# Patient Record
Sex: FEMALE | Race: WHITE
[De-identification: names, ages, dates, MRNs, and addresses within clinical notes are randomized per-mention and may not be internally consistent; named-entity substitution may affect disease eponyms.]

---

## 2017-02-08 ENCOUNTER — HOSPITAL ENCOUNTER (EMERGENCY)
Dept: HOSPITAL 25 - ED | Age: 37
Discharge: HOME | End: 2017-02-08
Payer: MEDICARE

## 2017-02-08 VITALS — DIASTOLIC BLOOD PRESSURE: 78 MMHG | SYSTOLIC BLOOD PRESSURE: 156 MMHG

## 2017-02-08 DIAGNOSIS — R10.32: Primary | ICD-10-CM

## 2017-02-08 DIAGNOSIS — Z72.0: ICD-10-CM

## 2017-02-08 DIAGNOSIS — Z88.5: ICD-10-CM

## 2017-02-08 LAB
ALBUMIN SERPL BCG-MCNC: 4.4 G/DL (ref 3.2–5.2)
ALP SERPL-CCNC: 70 U/L (ref 34–104)
ALT SERPL W P-5'-P-CCNC: 15 U/L (ref 7–52)
ANION GAP SERPL CALC-SCNC: 5 MMOL/L (ref 2–11)
AST SERPL-CCNC: 14 U/L (ref 13–39)
BUN SERPL-MCNC: 9 MG/DL (ref 6–24)
BUN/CREAT SERPL: 11.4 (ref 8–20)
CALCIUM SERPL-MCNC: 9.5 MG/DL (ref 8.6–10.3)
CHLORIDE SERPL-SCNC: 108 MMOL/L (ref 101–111)
GLOBULIN SER CALC-MCNC: 3 G/DL (ref 2–4)
GLUCOSE SERPL-MCNC: 89 MG/DL (ref 70–100)
HCO3 SERPL-SCNC: 22 MMOL/L (ref 22–32)
HCT VFR BLD AUTO: 48 % (ref 35–47)
HGB BLD-MCNC: 16 G/DL (ref 12–16)
LIPASE SERPL-CCNC: 16 U/L (ref 11–82)
MCH RBC QN AUTO: 30 PG (ref 27–31)
MCHC RBC AUTO-ENTMCNC: 34 G/DL (ref 31–36)
MCV RBC AUTO: 90 FL (ref 80–97)
POTASSIUM SERPL-SCNC: 4.1 MMOL/L (ref 3.5–5)
PROT SERPL-MCNC: 7.4 G/DL (ref 6.4–8.9)
RBC # BLD AUTO: 5.27 10^6/UL (ref 4–5.4)
SODIUM SERPL-SCNC: 135 MMOL/L (ref 133–145)
TROPONIN I SERPL-MCNC: 0 NG/ML (ref ?–0.04)
WBC # BLD AUTO: 8.5 10^3/UL (ref 3.5–10.8)

## 2017-02-08 PROCEDURE — 99283 EMERGENCY DEPT VISIT LOW MDM: CPT

## 2017-02-08 PROCEDURE — 83605 ASSAY OF LACTIC ACID: CPT

## 2017-02-08 PROCEDURE — 80053 COMPREHEN METABOLIC PANEL: CPT

## 2017-02-08 PROCEDURE — 74177 CT ABD & PELVIS W/CONTRAST: CPT

## 2017-02-08 PROCEDURE — 85025 COMPLETE CBC W/AUTO DIFF WBC: CPT

## 2017-02-08 PROCEDURE — 84702 CHORIONIC GONADOTROPIN TEST: CPT

## 2017-02-08 PROCEDURE — 86140 C-REACTIVE PROTEIN: CPT

## 2017-02-08 PROCEDURE — 36415 COLL VENOUS BLD VENIPUNCTURE: CPT

## 2017-02-08 PROCEDURE — 81003 URINALYSIS AUTO W/O SCOPE: CPT

## 2017-02-08 PROCEDURE — 83690 ASSAY OF LIPASE: CPT

## 2017-02-08 PROCEDURE — 84484 ASSAY OF TROPONIN QUANT: CPT

## 2017-02-08 NOTE — RAD
CLINICAL HISTORY: Left upper quadrant pain



COMPARISON: September 25, 2012



TECHNIQUE: Multiple contiguous axial CT scans were obtained of the abdomen and pelvis

after the administration of intravenous contrast. Coronal and sagittal multiplanar

reformations are submitted for review.  Oral contrast was administered.  Delayed images

were obtained through the abdomen



FINDINGS:    



LUNG BASES: The lung bases are clear.



LIVER: The liver is diffusely low in attenuation compared to the spleen. There are no

focal hepatic parenchymal masses.

BILE DUCTS: There is no intrahepatic or extrahepatic biliary dilatation.

GALLBLADDER: The gallbladder is normal, without pericholecystic inflammatory change.



PANCREAS: The pancreas is normal, without mass or ductal dilatation.

SPLEEN: Normal in size and appearance.



UPPER GI TRACT: Evaluation of the gastrointestinal tract is limited by incomplete gastric

distention. The upper GI tract is unremarkable.

SMALL BOWEL AND MESENTERY: The small bowel is normal in contour, course, and caliber.

There is no obstruction or dilatation.

COLON: The colon is normal in contour, course, caliber. There is no pericolonic

inflammatory change.



ADRENALS: Normal bilaterally.

KIDNEYS: The kidneys are normal in shape, size, contour, and axis. There is no

hydronephrosis or nephrolithiasis.

BLADDER: The bladder is smooth in contour.



PELVIC ORGANS: The uterus and adnexa are grossly normal for technique.



AORTA: The aorta is normal.

IVC: Unremarkable



LYMPH NODES: There is no lymphadenopathy by size criteria.



ABDOMINAL WALL: There is no evidence for abdominal wall hernia.

BONES AND SOFT TISSUES: The bones and soft tissues are unremarkable.

OTHER: None



IMPRESSION:

FATTY LIVER.

NO ACUTE CT PATHOLOGY OF THE VISUALIZED ABDOMEN OR PELVIS

## 2017-02-08 NOTE — ED
Navid MCGUIRE Billy, scribed for Chung Martínez MD on 02/08/17 at 1459 .





Complex/Multi-Sys Presentation





- HPI Summary


HPI Summary: 


Patient is 36 y/o female who presents to [facility] with left flank pain, left lower 

abdominal pain, and dyspnea for 4 days. Dyspnea is unchanged with recumbent 

position. She describes N/V/D and abdominal pain. Three weeks ago, she saw her 

PCP for these symptoms, and she states that a left ovarian cyst was visualized 

on ultrasound. Last week, she followed up with OB/GYN, who was unable to 

visualize the cyst on ultrasound. However, she states that she was given a 

diagnosis of endometriosis at that time. She reports pain worsened 4 days ago, 

and has been constant since. She also reports her abd feels distended and 

uncomfortable. Patient reports a history of psychiatric illnesses and is 

concerned that her symptoms are "all in her head."





- History Of Current Complaint


Chief Complaint: EDGeneral


Time Seen by Provider: 02/08/17 13:47


Hx Obtained From: Patient


Onset/Duration: Gradual Onset, Lasting Weeks, Still Present, Worse Since - 4 

days


Timing: Constant


Severity Currently: Moderate


Severity Initially: Moderate


Location: Pain At: - LLQ, left flank


Aggravating Factor(s): Nothing.


Alleviating Factor(s): Nothing.


Associated Signs And Symptoms: Positive: SOB, Nausea, Vomiting, Diarrhea, 

Abdominal Pain, Other - Flank pain





- Allergies/Home Medications


Allergies/Adverse Reactions: 


 Allergies











Allergy/AdvReac Type Severity Reaction Status Date / Time


 


Hydrocodone Allergy  Unknown Verified 02/15/16 00:08





   Reaction  





   Details  


 


SSRIS Allergy  Unknown Uncoded 02/15/16 00:08





   Reaction  





   Details  














PMH/Surg Hx/FS Hx/Imm Hx


Endocrine/Hematology History: 


   Denies: Hx Diabetes, Hx Systemic Lupus Erythematosus, Hx Thyroid Disease


Cardiovascular History: 


   Denies: Hx Hypertension


Respiratory History: 


   Denies: Hx Asthma, Hx Chronic Obstructive Pulmonary Disease (COPD)


GI History: 


   Denies: Hx Ulcer


Musculoskeletal History: 


   Denies: Hx Rheumatoid Arthritis


Psychiatric History: Reports: Hx Anxiety, Hx Eating Disorder, Hx Depression, Hx 

Panic Disorder, Hx Inpatient Treatment, Hx Community Mental Health Tx, Hx 

Bipolar Disorder, Hx Substance Abuse, Other Psychiatric Issues/Disorders


   Denies: Hx Attention Deficit Hyperactivity Disorder, Hx Post Traumatic 

Stress Disorder, Hx Schizophrenia, Hx Suicide Attempt, Hx of Violent Episodes 

Against Others





- Cancer History


Hx Chemotherapy: No


Hx Radiation Therapy: No


Hx Palliative Cancer Treatment: No





- Surgical History


Surgery Procedure, Year, and Place: 2001 - RIGHT Lymph nodes removed to biopsy 

thought she had cancer - she didn't.  2011- C section


Infectious Disease History: No


Infectious Disease History: 


   Denies: Hx Clostridium Difficile, Hx Hepatitis, Hx Human Immunodeficiency 

Virus (HIV), Hx of Known/Suspected MRSA, Hx Shingles, Hx Tuberculosis, Hx Known/

Suspected VRE, Hx Known/Suspected VRSA, History Other Infectious Disease, 

Traveled Outside the [place] in Last 30 Days





- Family History


Known Family History: Positive: Diabetes - Father , Other - CVA mother





- Social History


Alcohol Use: None


Hx Substance Use: No


Substance Use Type: Reports: None


Hx Tobacco Use: Yes


Smoking Status (MU): Heavy Every Day Tobacco Smoker





Review of Systems


Positive: Shortness Of Breath


Positive: Abdominal Pain, Vomiting, Diarrhea, Nausea, Other - Distended. 


Positive: flank pain - Left


All Other Systems Reviewed And Are Negative: Yes





Physical Exam


Triage Information Reviewed: Yes


Vital Signs On Initial Exam: 


 Initial Vitals











Temp Pulse Resp BP Pulse Ox


 


 98.2 F   87   18   139/81   100 


 


 02/08/17 13:15  02/08/17 13:15  02/08/17 13:15  02/08/17 13:15  02/08/17 13:15











Vital Signs Reviewed: Yes


Appearance: Positive: Well-Appearing, No Pain Distress


Skin: Positive: Warm, Skin Color Reflects Adequate Perfusion, Dry


Head/Face: Positive: Normal Head/Face Inspection


Eyes: Positive: Normal


ENT: Positive: Normal ENT inspection


Neck: Positive: Supple, Nontender


Respiratory/Lung Sounds: Positive: Clear to Auscultation, Breath Sounds Present


Cardiovascular: Positive: RRR


Abdomen Description: Positive: Other: - No tenderness to palpation with 

stethoscope. Mild tenderness to upper quadrants with deep palpation.


Musculoskeletal: Positive: Normal


Neurological: Positive: Normal





Diagnostics





- Vital Signs


 Vital Signs











  Temp Pulse Resp BP Pulse Ox


 


 02/08/17 13:15  98.2 F  87  18  139/81  100














- Laboratory


Lab Results: 


 Lab Results











  02/08/17 02/08/17 02/08/17 Range/Units





  14:10 14:10 14:10 


 


WBC  8.5    (3.5-10.8)  10^3/ul


 


RBC  5.27    (4.0-5.4)  10^6/ul


 


Hgb  16.0    (12.0-16.0)  g/dl


 


Hct  48 H    (35-47)  %


 


MCV  90    (80-97)  fL


 


MCH  30    (27-31)  pg


 


MCHC  34    (31-36)  g/dl


 


RDW  13    (10.5-15)  %


 


Plt Count  270    (150-450)  10^3/ul


 


MPV  9    (7.4-10.4)  um3


 


Neut % (Auto)  44.6    (38-83)  %


 


Lymph % (Auto)  46.8    (25-47)  %


 


Mono % (Auto)  6.3    (1-9)  %


 


Eos % (Auto)  1.8    (0-6)  %


 


Baso % (Auto)  0.5    (0-2)  %


 


Absolute Neuts (auto)  3.8    (1.5-7.7)  10^3/ul


 


Absolute Lymphs (auto)  4.0    (1.0-4.8)  10^3/ul


 


Absolute Monos (auto)  0.5    (0-0.8)  10^3/ul


 


Absolute Eos (auto)  0.2    (0-0.6)  10^3/ul


 


Absolute Basos (auto)  0    (0-0.2)  10^3/ul


 


Absolute Nucleated RBC  0    10^3/ul


 


Nucleated RBC %  0    


 


Sodium    135  (133-145)  mmol/L


 


Potassium    4.1  (3.5-5.0)  mmol/L


 


Chloride    108  (101-111)  mmol/L


 


Carbon Dioxide    22  (22-32)  mmol/L


 


Anion Gap    5  (2-11)  mmol/L


 


BUN    9  (6-24)  mg/dL


 


Creatinine    0.79  (0.51-0.95)  mg/dL


 


Est GFR ( Amer)    105.3  (>60)  


 


Est GFR (Non-Af Amer)    81.9  (>60)  


 


BUN/Creatinine Ratio    11.4  (8-20)  


 


Glucose    89  ()  mg/dL


 


Lactic Acid     (0.5-2.0)  mmol/L


 


Calcium    9.5  (8.6-10.3)  mg/dL


 


Total Bilirubin    0.40  (0.2-1.0)  mg/dL


 


AST    14  (13-39)  U/L


 


ALT    15  (7-52)  U/L


 


Alkaline Phosphatase    70  ()  U/L


 


Troponin I    0.00  (<0.04)  ng/mL


 


C-Reactive Protein    1.08  (< 5.00)  mg/L


 


Total Protein    7.4  (6.4-8.9)  g/dL


 


Albumin    4.4  (3.2-5.2)  g/dL


 


Globulin    3.0  (2-4)  g/dL


 


Albumin/Globulin Ratio    1.5  (1-3)  


 


Lipase    16  (11.0-82.0)  U/L


 


Beta HCG, Quant    < 0.60  mIU/mL


 


Urine Color   Yellow   


 


Urine Appearance   Cloudy   


 


Urine pH   6.0   (5-9)  


 


Ur Specific Gravity   1.019   (1.010-1.030)  


 


Urine Protein   Negative   (Negative)  


 


Urine Ketones   Trace H   (Negative)  


 


Urine Blood   Negative   (Negative)  


 


Urine Nitrate   Negative   (Negative)  


 


Urine Bilirubin   Negative   (Negative)  


 


Urine Urobilinogen   Negative   (Negative)  


 


Ur Leukocyte Esterase   Negative   (Negative)  


 


Urine Glucose   Negative   (Negative)  














  02/08/17 Range/Units





  14:10 


 


WBC   (3.5-10.8)  10^3/ul


 


RBC   (4.0-5.4)  10^6/ul


 


Hgb   (12.0-16.0)  g/dl


 


Hct   (35-47)  %


 


MCV   (80-97)  fL


 


MCH   (27-31)  pg


 


MCHC   (31-36)  g/dl


 


RDW   (10.5-15)  %


 


Plt Count   (150-450)  10^3/ul


 


MPV   (7.4-10.4)  um3


 


Neut % (Auto)   (38-83)  %


 


Lymph % (Auto)   (25-47)  %


 


Mono % (Auto)   (1-9)  %


 


Eos % (Auto)   (0-6)  %


 


Baso % (Auto)   (0-2)  %


 


Absolute Neuts (auto)   (1.5-7.7)  10^3/ul


 


Absolute Lymphs (auto)   (1.0-4.8)  10^3/ul


 


Absolute Monos (auto)   (0-0.8)  10^3/ul


 


Absolute Eos (auto)   (0-0.6)  10^3/ul


 


Absolute Basos (auto)   (0-0.2)  10^3/ul


 


Absolute Nucleated RBC   10^3/ul


 


Nucleated RBC %   


 


Sodium   (133-145)  mmol/L


 


Potassium   (3.5-5.0)  mmol/L


 


Chloride   (101-111)  mmol/L


 


Carbon Dioxide   (22-32)  mmol/L


 


Anion Gap   (2-11)  mmol/L


 


BUN   (6-24)  mg/dL


 


Creatinine   (0.51-0.95)  mg/dL


 


Est GFR ( Amer)   (>60)  


 


Est GFR (Non-Af Amer)   (>60)  


 


BUN/Creatinine Ratio   (8-20)  


 


Glucose   ()  mg/dL


 


Lactic Acid  0.6  (0.5-2.0)  mmol/L


 


Calcium   (8.6-10.3)  mg/dL


 


Total Bilirubin   (0.2-1.0)  mg/dL


 


AST   (13-39)  U/L


 


ALT   (7-52)  U/L


 


Alkaline Phosphatase   ()  U/L


 


Troponin I   (<0.04)  ng/mL


 


C-Reactive Protein   (< 5.00)  mg/L


 


Total Protein   (6.4-8.9)  g/dL


 


Albumin   (3.2-5.2)  g/dL


 


Globulin   (2-4)  g/dL


 


Albumin/Globulin Ratio   (1-3)  


 


Lipase   (11.0-82.0)  U/L


 


Beta HCG, Quant   mIU/mL


 


Urine Color   


 


Urine Appearance   


 


Urine pH   (5-9)  


 


Ur Specific Gravity   (1.010-1.030)  


 


Urine Protein   (Negative)  


 


Urine Ketones   (Negative)  


 


Urine Blood   (Negative)  


 


Urine Nitrate   (Negative)  


 


Urine Bilirubin   (Negative)  


 


Urine Urobilinogen   (Negative)  


 


Ur Leukocyte Esterase   (Negative)  


 


Urine Glucose   (Negative)  











Result Diagrams: 


 02/08/17 14:10





 02/08/17 14:10


Lab Statement: Any lab studies that have been ordered have been reviewed, and 

results considered in the medical decision making process.





- CT


  ** abd/pel


CT Interpretation: No Acute Changes


CT Interpretation Completed By: Radiologist





Complex Multi-Symp Course/Dx


Course Of Treatment: Ms. Foley presented C/O abdominal pain which she had a 

great deal of trouble characterizing.  Her W/U here was normal and she has an 

appointment with her PMD in the morning so I will let her go home to F/U.





- Diagnoses


Provider Diagnoses: 


 Abdominal pain








Discharge





- Discharge Plan


Condition: Stable


Disposition: HOME


Patient Education Materials:  Abdominal Pain (ED)


Referrals: 


Gabrielle Villanueva MD [Primary Care Provider] - 1 Day


Additional Instructions: 


FOLLOW UP WITH DR. VILLANUEVA AS SCHEDULED.





The documentation as recorded by the kelseyibNavid bhatti Billy accurately reflects the 

service I personally performed and the decisions made by me, Chung Martínez MD.

## 2017-05-05 ENCOUNTER — HOSPITAL ENCOUNTER (EMERGENCY)
Dept: HOSPITAL 25 - ED | Age: 37
Discharge: ANOTHER HOSPITAL | End: 2017-05-05
Payer: MEDICARE

## 2017-05-05 VITALS — DIASTOLIC BLOOD PRESSURE: 72 MMHG | SYSTOLIC BLOOD PRESSURE: 113 MMHG

## 2017-05-05 DIAGNOSIS — R19.7: ICD-10-CM

## 2017-05-05 DIAGNOSIS — R50.9: ICD-10-CM

## 2017-05-05 DIAGNOSIS — M54.9: ICD-10-CM

## 2017-05-05 DIAGNOSIS — R11.2: ICD-10-CM

## 2017-05-05 DIAGNOSIS — K57.92: Primary | ICD-10-CM

## 2017-05-05 DIAGNOSIS — R10.9: ICD-10-CM

## 2017-05-05 DIAGNOSIS — F17.210: ICD-10-CM

## 2017-05-05 LAB
ALBUMIN SERPL BCG-MCNC: 3.8 G/DL (ref 3.2–5.2)
ALP SERPL-CCNC: 63 U/L (ref 34–104)
ALT SERPL W P-5'-P-CCNC: 11 U/L (ref 7–52)
ANION GAP SERPL CALC-SCNC: 7 MMOL/L (ref 2–11)
AST SERPL-CCNC: 11 U/L (ref 13–39)
BUN SERPL-MCNC: 8 MG/DL (ref 6–24)
BUN/CREAT SERPL: 9.3 (ref 8–20)
CALCIUM SERPL-MCNC: 8.8 MG/DL (ref 8.6–10.3)
CHLORIDE SERPL-SCNC: 109 MMOL/L (ref 101–111)
GLOBULIN SER CALC-MCNC: 2.6 G/DL (ref 2–4)
GLUCOSE SERPL-MCNC: 103 MG/DL (ref 70–100)
HCO3 SERPL-SCNC: 21 MMOL/L (ref 22–32)
HCT VFR BLD AUTO: 42 % (ref 35–47)
HGB BLD-MCNC: 13.7 G/DL (ref 12–16)
LIPASE SERPL-CCNC: 35 U/L (ref 11–82)
MCH RBC QN AUTO: 30 PG (ref 27–31)
MCHC RBC AUTO-ENTMCNC: 33 G/DL (ref 31–36)
MCV RBC AUTO: 92 FL (ref 80–97)
POTASSIUM SERPL-SCNC: 3.7 MMOL/L (ref 3.5–5)
PROT SERPL-MCNC: 6.4 G/DL (ref 6.4–8.9)
RBC # BLD AUTO: 4.53 10^6/UL (ref 4–5.4)
SODIUM SERPL-SCNC: 137 MMOL/L (ref 133–145)
TROPONIN I SERPL-MCNC: 0.01 NG/ML (ref ?–0.04)
WBC # BLD AUTO: 10.4 10^3/UL (ref 3.5–10.8)

## 2017-05-05 PROCEDURE — 85025 COMPLETE CBC W/AUTO DIFF WBC: CPT

## 2017-05-05 PROCEDURE — 99283 EMERGENCY DEPT VISIT LOW MDM: CPT

## 2017-05-05 PROCEDURE — 85730 THROMBOPLASTIN TIME PARTIAL: CPT

## 2017-05-05 PROCEDURE — 84484 ASSAY OF TROPONIN QUANT: CPT

## 2017-05-05 PROCEDURE — 84702 CHORIONIC GONADOTROPIN TEST: CPT

## 2017-05-05 PROCEDURE — 36415 COLL VENOUS BLD VENIPUNCTURE: CPT

## 2017-05-05 PROCEDURE — 74177 CT ABD & PELVIS W/CONTRAST: CPT

## 2017-05-05 PROCEDURE — 83690 ASSAY OF LIPASE: CPT

## 2017-05-05 PROCEDURE — 80053 COMPREHEN METABOLIC PANEL: CPT

## 2017-05-05 PROCEDURE — 93005 ELECTROCARDIOGRAM TRACING: CPT

## 2017-05-05 PROCEDURE — 85610 PROTHROMBIN TIME: CPT

## 2017-05-05 PROCEDURE — 83605 ASSAY OF LACTIC ACID: CPT

## 2017-05-05 NOTE — ED
Patrizia MCGUIRE Auryana, scribed for Prudencio Foster MD on 05/05/17 at 0749 .





Progress





- Progress Note


Progress Note: 


Sign out by Dr. Hurd to Dr. Foster at 07:00 5/5/17


Pending ABD/PEL CT at time of sign out.





37 year old female presents with moderate left sided abdominal pain starting 2 

days ago. She also has decreased appetite with early satiation, nausea and dry 

heaves-every morning. She denies any pain medication recently-states out of 

Percocet Rx for the last few days.





Will give medication to treat pain. Awaiting CT ABD/PEL.








- Results/Orders


Results/Orders: 


ABD/PEL CT                                                                     

                                   





IMPRESSION: NORMAL APPENDIX. LIKELY DIVERTICULITIS OF THE SIGMOID COLON WITHOUT 

EVIDENCE


OF PERIDIVERTICULAR ABSCESS.








- EKG/XRAY/CT


EKG: NSR


Comments: 07:06 NSR, normal axis





Re-Evaluation





- Re-Evaluation


  ** First Eval


Re-Evaluation Time: 10:20 - DICUSSED LABS. IMAGING, AND PLAN OF ACTION 


Change: Improved





Course/Dx





- Course


Course Of Treatment: pt will be discharged to home on Flagyl 500mg po qid x 10 

days, cipro 500mg po bid x 10days and percocet one po q 6hours





- Diagnoses


Provider Diagnoses: 


 Diverticulitis








The documentation as recorded by the Patrizia linn Auryana accurately 

reflects the service I personally performed and the decisions made by me, Prudencio Foster MD.

## 2017-05-05 NOTE — RAD
Indication: Abdominal pain mid and lower left quadrant.



Contrast: Administered 91.7 ml of OMNIPAQUE 300 mgi/ml 



CT of the abdomen and pelvis was performed after oral and IV contrast administration.

Coronal and sagittal reconstructed images were obtained.



The lung bases demonstrate no pleural fluid, nodules or masses. Heart is of normal size

without evidence of pericardial effusion.



The liver is normal in size. No focal lesions or intrahepatic ductal dilatation is noted.

The gallbladder demonstrates no calcified gallstones. No pericholecystic fluid or wall

thickening is identified. The pancreas demonstrates no pancreatic duct dilatation. The

spleen is normal in size. No adrenal masses are noted. The kidneys demonstrate symmetric

nephrograms. No retroperitoneal lymphadenopathy is noted. Dilated loops of bowel are

noted. The colon is filled with stool.



CT of the pelvis demonstrates appendix to be normal. No dilated loops of bowel are noted.

The uterus and ovaries are unremarkable. No hernias are noted. There is some narrowing of

the sigmoid colon with some mild wall thickening. Some mild infiltration of fat is noted.

Some minimal diverticulitis of the sigmoid colon should BE considered. No abscess is

noted.



IMPRESSION: NORMAL APPENDIX. LIKELY DIVERTICULITIS OF THE SIGMOID COLON WITHOUT EVIDENCE

OF PERIDIVERTICULAR ABSCESS.

## 2017-05-05 NOTE — ED
Yayo MCGUIRE Aidan, scribed for VickeyLon on 05/05/17 at 0637 .





Abdominal Pain/Female





- HPI Summary


HPI Summary: 


38 y/o female presents to the ED with a complaint of acute, constant, moderate-

to-severe (8/10) abdominal pain that has persisted intermittently for over a 

week and just recently became constant. 2 days ago, she had a nuclear test 

done. Since the test, she has reported increased nausea, vomiting, diarrhea, 

extreme lack of appetite, abdominal pain, back pain, and bloating. She has an 

appointment with a GI specialist this coming Tuesday.





- History of Current Complaint


Chief Complaint: EDAbdPain


Stated Complaint: N,V,D/LOSS OF APPITITE,LOWER BACK PAIN/FEVER


Time Seen by Provider: 05/05/17 06:02


Hx Obtained From: Patient


Hx Last Menstrual Period: 4/9/13


Pregnant?: No


Onset/Duration: Gradual Onset, Lasting Days, Still Present


Timing: Constant


Severity Initially: Moderate


Severity Currently: Moderate


Pain Intensity: 8


Pain Scale Used: 0-10 Numeric


Location: Diffuse


Radiates: No


Character: Sharp


Aggravating Factor(s): Other: - unknown


Alleviating Factor(s): Other: - unknown


Associated Signs and Symptoms: Positive: Back Pain, Nausea, Vomiting, Diarrhea, 

Other: - bloating, lack of appetite


Allergies/Adverse Reactions: 


 Allergies











Allergy/AdvReac Type Severity Reaction Status Date / Time


 


Hydrocodone Allergy  Unknown Verified 05/05/17 05:54





   Reaction  





   Details  


 


SSRIS Allergy  Unknown Uncoded 05/05/17 05:54





   Reaction  





   Details  














PMH/Surg Hx/FS Hx/Imm Hx


Endocrine/Hematology History: 


   Denies: Hx Diabetes, Hx Systemic Lupus Erythematosus, Hx Thyroid Disease


Cardiovascular History: 


   Denies: Hx Hypertension


Respiratory History: 


   Denies: Hx Asthma, Hx Chronic Obstructive Pulmonary Disease (COPD)


GI History: 


   Denies: Hx Ulcer


Musculoskeletal History: 


   Denies: Hx Rheumatoid Arthritis


Psychiatric History: Reports: Hx Anxiety, Hx Eating Disorder, Hx Depression, Hx 

Panic Disorder, Hx Inpatient Treatment, Hx Community Mental Health Tx, Hx 

Bipolar Disorder, Hx Substance Abuse, Other Psychiatric Issues/Disorders


   Denies: Hx Attention Deficit Hyperactivity Disorder, Hx Post Traumatic 

Stress Disorder, Hx Schizophrenia, Hx Suicide Attempt, Hx of Violent Episodes 

Against Others





- Cancer History


Hx Chemotherapy: No


Hx Radiation Therapy: No


Hx Palliative Cancer Treatment: No





- Surgical History


Surgery Procedure, Year, and Place: 2001 - RIGHT Lymph nodes removed to biopsy 

thought she had cancer - she didn't.  2011- C section


Infectious Disease History: No


Infectious Disease History: 


   Denies: Hx Clostridium Difficile, Hx Hepatitis, Hx Human Immunodeficiency 

Virus (HIV), Hx of Known/Suspected MRSA, Hx Shingles, Hx Tuberculosis, Hx Known/

Suspected VRE, Hx Known/Suspected VRSA, History Other Infectious Disease, 

Traveled Outside the [place] in Last 30 Days





- Family History


Known Family History: Positive: Diabetes - Father , Other - CVA mother





- Social History


Occupation: Disabled


Lives: Alone


Alcohol Use: Rare


Hx Substance Use: No


Substance Use Type: Reports: Marijuana


Hx Tobacco Use: Yes


Smoking Status (MU): Heavy Every Day Tobacco Smoker


Type: Cigarettes





Review of Systems


Constitutional: Negative


Eyes: Negative


ENT: Negative


Cardiovascular: Negative


Respiratory: Negative


Positive: Abdominal Pain, Vomiting, Diarrhea, Nausea, Other - lack of appetite


Genitourinary: Negative


Musculoskeletal: Negative


Skin: Negative


Neurological: Negative


Psychological: Normal


All Other Systems Reviewed And Are Negative: Yes





Physical Exam


Triage Information Reviewed: Yes


Vital Signs On Initial Exam: 


 Initial Vitals











Temp Pulse Resp BP Pulse Ox


 


 97.9 F   75   16   143/97   97 


 


 05/05/17 05:40  05/05/17 05:40  05/05/17 05:40  05/05/17 05:40  05/05/17 05:40











Vital Signs Reviewed: Yes


Appearance: Positive: Well-Appearing, No Pain Distress


Skin: Positive: Warm, Skin Color Reflects Adequate Perfusion, Dry


Head/Face: Positive: Normal Head/Face Inspection


Eyes: Positive: Normal


ENT: Positive: Normal ENT inspection


Neck: Positive: Supple, Nontender


Respiratory/Lung Sounds: Positive: Clear to Auscultation, Breath Sounds Present


Cardiovascular: Positive: RRR


Abdomen Description: Positive: Soft, Other: - diffuse abdominal tenderness


Bowel Sounds: Positive: Present


Musculoskeletal: Positive: Normal


Neurological: Positive: Normal


Psychiatric: Positive: Affect/Mood Appropriate





Diagnostics





- Vital Signs


 Vital Signs











  Temp Pulse Resp BP Pulse Ox


 


 05/05/17 05:55   76    96


 


 05/05/17 05:40  97.9 F  75  16  143/97  97














- Laboratory


Lab Results: 


 Lab Results











  05/05/17 05/05/17 05/05/17 Range/Units





  06:05 06:05 06:05 


 


WBC  10.4    (3.5-10.8)  10^3/ul


 


RBC  4.53    (4.0-5.4)  10^6/ul


 


Hgb  13.7    (12.0-16.0)  g/dl


 


Hct  42    (35-47)  %


 


MCV  92    (80-97)  fL


 


MCH  30    (27-31)  pg


 


MCHC  33    (31-36)  g/dl


 


RDW  13    (10.5-15)  %


 


Plt Count  254    (150-450)  10^3/ul


 


MPV  9    (7.4-10.4)  um3


 


Neut % (Auto)  59.2    (38-83)  %


 


Lymph % (Auto)  29.7    (25-47)  %


 


Mono % (Auto)  5.4    (1-9)  %


 


Eos % (Auto)  5.0    (0-6)  %


 


Baso % (Auto)  0.7    (0-2)  %


 


Absolute Neuts (auto)  6.1    (1.5-7.7)  10^3/ul


 


Absolute Lymphs (auto)  3.1    (1.0-4.8)  10^3/ul


 


Absolute Monos (auto)  0.6    (0-0.8)  10^3/ul


 


Absolute Eos (auto)  0.5    (0-0.6)  10^3/ul


 


Absolute Basos (auto)  0.1    (0-0.2)  10^3/ul


 


Absolute Nucleated RBC  0    10^3/ul


 


Nucleated RBC %  0    


 


INR (Anticoag Therapy)     (0.89-1.11)  


 


APTT     (26.0-36.3)  seconds


 


Sodium   137   (133-145)  mmol/L


 


Potassium   3.7   (3.5-5.0)  mmol/L


 


Chloride   109   (101-111)  mmol/L


 


Carbon Dioxide   21 L   (22-32)  mmol/L


 


Anion Gap   7   (2-11)  mmol/L


 


BUN   8   (6-24)  mg/dL


 


Creatinine   0.86   (0.51-0.95)  mg/dL


 


Est GFR ( Amer)   95.5   (>60)  


 


Est GFR (Non-Af Amer)   74.2   (>60)  


 


BUN/Creatinine Ratio   9.3   (8-20)  


 


Glucose   103 H   ()  mg/dL


 


Lactic Acid    0.6  (0.5-2.0)  mmol/L


 


Calcium   8.8   (8.6-10.3)  mg/dL


 


Total Bilirubin   0.40   (0.2-1.0)  mg/dL


 


AST   11 L   (13-39)  U/L


 


ALT   11   (7-52)  U/L


 


Alkaline Phosphatase   63   ()  U/L


 


Troponin I   0.01   (<0.04)  ng/mL


 


Total Protein   6.4   (6.4-8.9)  g/dL


 


Albumin   3.8   (3.2-5.2)  g/dL


 


Globulin   2.6   (2-4)  g/dL


 


Albumin/Globulin Ratio   1.5   (1-3)  


 


Lipase   35   (11.0-82.0)  U/L


 


Beta HCG, Quant   < 0.60   mIU/mL














  05/05/17 Range/Units





  06:05 


 


WBC   (3.5-10.8)  10^3/ul


 


RBC   (4.0-5.4)  10^6/ul


 


Hgb   (12.0-16.0)  g/dl


 


Hct   (35-47)  %


 


MCV   (80-97)  fL


 


MCH   (27-31)  pg


 


MCHC   (31-36)  g/dl


 


RDW   (10.5-15)  %


 


Plt Count   (150-450)  10^3/ul


 


MPV   (7.4-10.4)  um3


 


Neut % (Auto)   (38-83)  %


 


Lymph % (Auto)   (25-47)  %


 


Mono % (Auto)   (1-9)  %


 


Eos % (Auto)   (0-6)  %


 


Baso % (Auto)   (0-2)  %


 


Absolute Neuts (auto)   (1.5-7.7)  10^3/ul


 


Absolute Lymphs (auto)   (1.0-4.8)  10^3/ul


 


Absolute Monos (auto)   (0-0.8)  10^3/ul


 


Absolute Eos (auto)   (0-0.6)  10^3/ul


 


Absolute Basos (auto)   (0-0.2)  10^3/ul


 


Absolute Nucleated RBC   10^3/ul


 


Nucleated RBC %   


 


INR (Anticoag Therapy)  0.84 L  (0.89-1.11)  


 


APTT  31.1  (26.0-36.3)  seconds


 


Sodium   (133-145)  mmol/L


 


Potassium   (3.5-5.0)  mmol/L


 


Chloride   (101-111)  mmol/L


 


Carbon Dioxide   (22-32)  mmol/L


 


Anion Gap   (2-11)  mmol/L


 


BUN   (6-24)  mg/dL


 


Creatinine   (0.51-0.95)  mg/dL


 


Est GFR ( Amer)   (>60)  


 


Est GFR (Non-Af Amer)   (>60)  


 


BUN/Creatinine Ratio   (8-20)  


 


Glucose   ()  mg/dL


 


Lactic Acid   (0.5-2.0)  mmol/L


 


Calcium   (8.6-10.3)  mg/dL


 


Total Bilirubin   (0.2-1.0)  mg/dL


 


AST   (13-39)  U/L


 


ALT   (7-52)  U/L


 


Alkaline Phosphatase   ()  U/L


 


Troponin I   (<0.04)  ng/mL


 


Total Protein   (6.4-8.9)  g/dL


 


Albumin   (3.2-5.2)  g/dL


 


Globulin   (2-4)  g/dL


 


Albumin/Globulin Ratio   (1-3)  


 


Lipase   (11.0-82.0)  U/L


 


Beta HCG, Quant   mIU/mL











Result Diagrams: 


 05/05/17 06:05





 05/05/17 06:05


Lab Statement: Any lab studies that have been ordered have been reviewed, and 

results considered in the medical decision making process.





Abdominal Pain Fem Course/Dx





- Course


Course Of Treatment: This is a 38 y/o female presenting with diffuse abomdinal 

pain, nausea, vomiting, diarrhea, and severe lack of appetite.





- Diagnoses


Differential Diagnosis: Positive: Other


Provider Diagnoses: 


 Diverticulitis, Abdominal pain








Discharge





- Discharge Plan


Condition: Guarded


Disposition: OTHER


Discharge Disposition Comment: signed out to Dr Foster


Prescriptions: 


Ciprofloxacin TAB* [Cipro 500 MG TAB*] 500 mg PO BID #20 tab


Metronidazole [Flagyl 500 MG TAB] 500 mg PO QID #40 tab


oxyCODONE/Acetamin 5/325 MG* [Percocet 5/325 TAB*] 1 tab PO Q6H PRN #20 tab MDD 

4


 PRN Reason: pain


Patient Education Materials:  Ciprofloxacin (By mouth), Metronidazole (By mouth)

, Narcotic-Analgesic/Acetaminophen (By mouth), Diverticulitis (ED)


Referrals: 


Gabrielle Villanueva MD [Primary Care Provider] - 2 Days





The documentation as recorded by the Yayo linn Aidan accurately reflects 

the service I personally performed and the decisions made by me, Lon Hurd.

## 2017-05-26 ENCOUNTER — HOSPITAL ENCOUNTER (EMERGENCY)
Dept: HOSPITAL 25 - ED | Age: 37
LOS: 1 days | Discharge: HOME | End: 2017-05-27
Payer: MEDICARE

## 2017-05-26 VITALS — SYSTOLIC BLOOD PRESSURE: 145 MMHG | DIASTOLIC BLOOD PRESSURE: 105 MMHG

## 2017-05-26 DIAGNOSIS — F17.210: ICD-10-CM

## 2017-05-26 DIAGNOSIS — F31.9: ICD-10-CM

## 2017-05-26 DIAGNOSIS — F41.9: Primary | ICD-10-CM

## 2017-05-26 LAB
ALBUMIN SERPL BCG-MCNC: 4.3 G/DL (ref 3.2–5.2)
ALP SERPL-CCNC: 61 U/L (ref 34–104)
ALT SERPL W P-5'-P-CCNC: 19 U/L (ref 7–52)
ANION GAP SERPL CALC-SCNC: 5 MMOL/L (ref 2–11)
APAP SERPL-MCNC: < 15 MCG/ML
AST SERPL-CCNC: 23 U/L (ref 13–39)
BUN SERPL-MCNC: 8 MG/DL (ref 6–24)
BUN/CREAT SERPL: 8.8 (ref 8–20)
CALCIUM SERPL-MCNC: 9.6 MG/DL (ref 8.6–10.3)
CHLORIDE SERPL-SCNC: 107 MMOL/L (ref 101–111)
GLOBULIN SER CALC-MCNC: 2.7 G/DL (ref 2–4)
GLUCOSE SERPL-MCNC: 101 MG/DL (ref 70–100)
HCO3 SERPL-SCNC: 25 MMOL/L (ref 22–32)
HCT VFR BLD AUTO: 42 % (ref 35–47)
HGB BLD-MCNC: 14.1 G/DL (ref 12–16)
MCH RBC QN AUTO: 30 PG (ref 27–31)
MCHC RBC AUTO-ENTMCNC: 33 G/DL (ref 31–36)
MCV RBC AUTO: 91 FL (ref 80–97)
POTASSIUM SERPL-SCNC: 3.6 MMOL/L (ref 3.5–5)
PROT SERPL-MCNC: 7 G/DL (ref 6.4–8.9)
RBC # BLD AUTO: 4.65 10^6/UL (ref 4–5.4)
SALICYLATES SERPL-MCNC: < 2.5 MG/DL (ref ?–30)
SODIUM SERPL-SCNC: 137 MMOL/L (ref 133–145)
TSH SERPL-ACNC: 0.64 MCIU/ML (ref 0.34–5.6)
WBC # BLD AUTO: 10.8 10^3/UL (ref 3.5–10.8)

## 2017-05-26 PROCEDURE — 84443 ASSAY THYROID STIM HORMONE: CPT

## 2017-05-26 PROCEDURE — 80329 ANALGESICS NON-OPIOID 1 OR 2: CPT

## 2017-05-26 PROCEDURE — 99284 EMERGENCY DEPT VISIT MOD MDM: CPT

## 2017-05-26 PROCEDURE — 85025 COMPLETE CBC W/AUTO DIFF WBC: CPT

## 2017-05-26 PROCEDURE — 80053 COMPREHEN METABOLIC PANEL: CPT

## 2017-05-26 PROCEDURE — 84702 CHORIONIC GONADOTROPIN TEST: CPT

## 2017-05-26 PROCEDURE — 81003 URINALYSIS AUTO W/O SCOPE: CPT

## 2017-05-26 PROCEDURE — 36415 COLL VENOUS BLD VENIPUNCTURE: CPT

## 2017-05-26 PROCEDURE — 80307 DRUG TEST PRSMV CHEM ANLYZR: CPT

## 2017-05-26 PROCEDURE — 80320 DRUG SCREEN QUANTALCOHOLS: CPT

## 2017-05-26 PROCEDURE — G0480 DRUG TEST DEF 1-7 CLASSES: HCPCS

## 2017-05-26 NOTE — ED
Maksim MCGUIRE SooYoung, scribed for Seth Craft MD on 05/26/17 at 2225 .





Psychiatric Complaint





- HPI Summary


HPI Summary: 





A 36 y/o F presents to ED for MHE. Pt states her  took their children 

and will not tell her their location unless she receives a MHE. Pt states she 

has had several physical ailments since November that doctors have been unable 

to dx. Pert PMHx: bipolar disorder, anxiety, depression. 





- History Of Current Complaint


Chief Complaint: EDMentalHealth


Time Seen by Provider: 05/26/17 22:19


Hx Obtained From: Patient


Hx Last Menstrual Period: 4/9/13


Onset/Duration: Still Present


Timing: Constant


Aggravating Factor(s): Recent Stress


Related History: Positive For: Prior Psychiatric Issues





- Allergies/Home Medications


Allergies/Adverse Reactions: 


 Allergies











Allergy/AdvReac Type Severity Reaction Status Date / Time


 


Hydrocodone Allergy  Unknown Verified 05/05/17 05:54





   Reaction  





   Details  


 


SSRIS Allergy  Unknown Uncoded 05/05/17 05:54





   Reaction  





   Details  














PMH/Surg Hx/FS Hx/Imm Hx


Previously Healthy: No


Endocrine/Hematology History: 


   Denies: Hx Diabetes, Hx Systemic Lupus Erythematosus, Hx Thyroid Disease


Cardiovascular History: 


   Denies: Hx Hypertension


Respiratory History: 


   Denies: Hx Asthma, Hx Chronic Obstructive Pulmonary Disease (COPD)


GI History: 


   Denies: Hx Ulcer


 History: 


   Denies: Hx Renal Disease


Musculoskeletal History: 


   Denies: Hx Rheumatoid Arthritis


Psychiatric History: Reports: Hx Anxiety, Hx Eating Disorder, Hx Depression, Hx 

Panic Disorder, Hx Inpatient Treatment, Hx Community Mental Health Tx, Hx 

Bipolar Disorder, Hx Substance Abuse, Other Psychiatric Issues/Disorders


   Denies: Hx Attention Deficit Hyperactivity Disorder, Hx Post Traumatic 

Stress Disorder, Hx Schizophrenia, Hx Suicide Attempt, Hx of Violent Episodes 

Against Others





- Cancer History


Hx Chemotherapy: No


Hx Radiation Therapy: No


Hx Palliative Cancer Treatment: No





- Surgical History


Surgery Procedure, Year, and Place: 2001 - RIGHT Lymph nodes removed to biopsy 

thought she had cancer - she didn't.  2011- C section


Infectious Disease History: 


   Denies: Hx Clostridium Difficile, Hx Hepatitis, Hx Human Immunodeficiency 

Virus (HIV), Hx of Known/Suspected MRSA, Hx Shingles, Hx Tuberculosis, Hx Known/

Suspected VRE, Hx Known/Suspected VRSA, History Other Infectious Disease, 

Traveled Outside the [place] in Last 30 Days





- Family History


Known Family History: Positive: Diabetes - Father , Other - CVA mother





- Social History


Occupation: Disabled


Lives: With Family


Alcohol Use: Rare


Hx Substance Use: Yes


Substance Use Type: Reports: Marijuana


Hx Tobacco Use: Yes


Smoking Status (MU): Heavy Every Day Tobacco Smoker


Type: Cigarettes





Review of Systems


Negative: Fever, Skin Diaphoresis


All Other Systems Reviewed And Are Negative: Yes





Physical Exam


Triage Information Reviewed: Yes


Vital Signs On Initial Exam: 


 Initial Vitals











Temp Pulse Resp BP Pulse Ox


 


 98.5 F   91   20   145/105   100 


 


 05/26/17 21:52  05/26/17 21:52  05/26/17 21:52  05/26/17 21:52  05/26/17 21:52











Vital Signs Reviewed: Yes


Appearance: Positive: Well-Appearing, No Pain Distress - anxious


Skin: Positive: Warm


Head/Face: Positive: Normal Head/Face Inspection


Eyes: Positive: DELL


ENT: Positive: Hearing grossly normal


Neck: Positive: Supple


Respiratory/Lung Sounds: Positive: Breath Sounds Present


Cardiovascular: Positive: RRR


Abdomen Description: Positive: Nontender, Soft


Bowel Sounds: Positive: Present


Musculoskeletal: Positive: Strength/ROM Intact


Neurological: Positive: Normal Gait


Psychiatric: Positive: Anxious





Diagnostics





- Vital Signs


 Vital Signs











  Temp Pulse Resp BP Pulse Ox


 


 05/26/17 21:52  98.5 F  91  20  145/105  100














- Laboratory


Result Diagrams: 


 05/26/17 22:35





 05/26/17 22:35


Lab Statement: Any lab studies that have been ordered have been reviewed, and 

results considered in the medical decision making process.





Course/Dx





- Course


Course Of Treatment: Pt is a 36 y/o F presenting for MHE. Pt states her  

took their children and will not tell her their location unless she receives a 

MHE. Pert PMHx: bipolar disorder, anxiety, depression. Pt states she will do 

whatever is best for her children.  Pt given IBP, Zofran, nicotine mouth piece, 

patch and inhaler in ED. Lab results are nml. UA shows trace ketones, specific 

gravity is 1.008. Medically cleared for MHE at 2359.





- Differential Dx/Clinical Impression


Provider Diagnosis: 


 Anxiety








Discharge





- Discharge Plan


Condition: Stable


Disposition: HOME


Patient Education Materials:  Bipolar Disorder (ED), Anxiety (ED)


Referrals: 


[facility] [Other] (Please see your therapist, Suzy Hale, at [facility], at your earliest convenience.)


Gabrielle Villanueva MD [Primary Care Provider] - 





The documentation as recorded by the Maksim linn SooYoung accurately 

reflects the service I personally performed and the decisions made by me, 

Seth Craft MD.

## 2017-05-27 ENCOUNTER — HOSPITAL ENCOUNTER (INPATIENT)
Dept: HOSPITAL 25 - ED | Age: 37
LOS: 19 days | Discharge: HOME | DRG: 885 | End: 2017-06-15
Attending: PSYCHIATRY & NEUROLOGY | Admitting: PSYCHIATRY & NEUROLOGY
Payer: MEDICARE

## 2017-05-27 DIAGNOSIS — Z88.6: ICD-10-CM

## 2017-05-27 DIAGNOSIS — F31.13: Primary | ICD-10-CM

## 2017-05-27 DIAGNOSIS — K58.9: ICD-10-CM

## 2017-05-27 DIAGNOSIS — Z83.3: ICD-10-CM

## 2017-05-27 DIAGNOSIS — Z82.3: ICD-10-CM

## 2017-05-27 DIAGNOSIS — N83.202: ICD-10-CM

## 2017-05-27 DIAGNOSIS — F12.90: ICD-10-CM

## 2017-05-27 DIAGNOSIS — F41.0: ICD-10-CM

## 2017-05-27 DIAGNOSIS — F50.9: ICD-10-CM

## 2017-05-27 DIAGNOSIS — Z72.89: ICD-10-CM

## 2017-05-27 DIAGNOSIS — F17.210: ICD-10-CM

## 2017-05-27 DIAGNOSIS — N80.9: ICD-10-CM

## 2017-05-27 DIAGNOSIS — R40.2412: ICD-10-CM

## 2017-05-27 DIAGNOSIS — Z88.8: ICD-10-CM

## 2017-05-27 DIAGNOSIS — F60.3: ICD-10-CM

## 2017-05-27 LAB
ALBUMIN SERPL BCG-MCNC: 4 G/DL (ref 3.2–5.2)
ALP SERPL-CCNC: 57 U/L (ref 34–104)
ALT SERPL W P-5'-P-CCNC: 18 U/L (ref 7–52)
ANION GAP SERPL CALC-SCNC: 9 MMOL/L (ref 2–11)
APAP SERPL-MCNC: < 15 MCG/ML
AST SERPL-CCNC: 24 U/L (ref 13–39)
BUN SERPL-MCNC: 8 MG/DL (ref 6–24)
BUN/CREAT SERPL: 9.8 (ref 8–20)
CALCIUM SERPL-MCNC: 9 MG/DL (ref 8.6–10.3)
CHLORIDE SERPL-SCNC: 111 MMOL/L (ref 101–111)
GLOBULIN SER CALC-MCNC: 2.4 G/DL (ref 2–4)
GLUCOSE SERPL-MCNC: 97 MG/DL (ref 70–100)
HCO3 SERPL-SCNC: 20 MMOL/L (ref 22–32)
HCT VFR BLD AUTO: 40 % (ref 35–47)
HGB BLD-MCNC: 13.4 G/DL (ref 12–16)
MCH RBC QN AUTO: 31 PG (ref 27–31)
MCHC RBC AUTO-ENTMCNC: 34 G/DL (ref 31–36)
MCV RBC AUTO: 91 FL (ref 80–97)
POTASSIUM SERPL-SCNC: 3.2 MMOL/L (ref 3.5–5)
PROT SERPL-MCNC: 6.4 G/DL (ref 6.4–8.9)
RBC # BLD AUTO: 4.38 10^6/UL (ref 4–5.4)
SALICYLATES SERPL-MCNC: < 2.5 MG/DL (ref ?–30)
SODIUM SERPL-SCNC: 140 MMOL/L (ref 133–145)
TSH SERPL-ACNC: 0.37 MCIU/ML (ref 0.34–5.6)
WBC # BLD AUTO: 9.2 10^3/UL (ref 3.5–10.8)

## 2017-05-27 PROCEDURE — 90853 GROUP PSYCHOTHERAPY: CPT

## 2017-05-27 PROCEDURE — 84443 ASSAY THYROID STIM HORMONE: CPT

## 2017-05-27 PROCEDURE — 74000: CPT

## 2017-05-27 PROCEDURE — 99284 EMERGENCY DEPT VISIT MOD MDM: CPT

## 2017-05-27 PROCEDURE — 81003 URINALYSIS AUTO W/O SCOPE: CPT

## 2017-05-27 PROCEDURE — 85025 COMPLETE CBC W/AUTO DIFF WBC: CPT

## 2017-05-27 PROCEDURE — 87086 URINE CULTURE/COLONY COUNT: CPT

## 2017-05-27 PROCEDURE — 36415 COLL VENOUS BLD VENIPUNCTURE: CPT

## 2017-05-27 PROCEDURE — 99222 1ST HOSP IP/OBS MODERATE 55: CPT

## 2017-05-27 PROCEDURE — 99232 SBSQ HOSP IP/OBS MODERATE 35: CPT

## 2017-05-27 PROCEDURE — 80329 ANALGESICS NON-OPIOID 1 OR 2: CPT

## 2017-05-27 PROCEDURE — 99233 SBSQ HOSP IP/OBS HIGH 50: CPT

## 2017-05-27 PROCEDURE — 80053 COMPREHEN METABOLIC PANEL: CPT

## 2017-05-27 PROCEDURE — 81015 MICROSCOPIC EXAM OF URINE: CPT

## 2017-05-27 PROCEDURE — G0480 DRUG TEST DEF 1-7 CLASSES: HCPCS

## 2017-05-27 PROCEDURE — 90834 PSYTX W PT 45 MINUTES: CPT

## 2017-05-27 PROCEDURE — 99238 HOSP IP/OBS DSCHRG MGMT 30/<: CPT

## 2017-05-27 PROCEDURE — 80320 DRUG SCREEN QUANTALCOHOLS: CPT

## 2017-05-27 PROCEDURE — 84702 CHORIONIC GONADOTROPIN TEST: CPT

## 2017-05-27 PROCEDURE — 99231 SBSQ HOSP IP/OBS SF/LOW 25: CPT

## 2017-05-27 PROCEDURE — 80307 DRUG TEST PRSMV CHEM ANLYZR: CPT

## 2017-05-27 NOTE — ED
Emily MCGUIRE Rebecca, scribed for Monserrat Howell MD on 05/27/17 at 1856 .





Psychiatric Complaint





- HPI Summary


HPI Summary: 


Pt is a 38 y/o F BIBA who presents to ED for MHE. Since arriving in the ED, pt 

has been pacing the room and repeatedly angrily yelling about how her  

took her kids. Pt was D/C from [facility] ED this morning. PMHx anxiety, depression, 

bipolar disorder, panic disorder, inpatient MH treatment and substance abuse. 





Level 5 caveat due to uncooperative disposition. 








- History Of Current Complaint


Chief Complaint: EDMentalHealth


Time Seen by Provider: 05/27/17 18:25


Hx From Patient Unobtainable Due To: Other - Uncooperative


Hx Last Menstrual Period: 4/9/13


Character: Anxious - Pacing the room





- Allergies/Home Medications


Allergies/Adverse Reactions: 


 Allergies











Allergy/AdvReac Type Severity Reaction Status Date / Time


 


Hydrocodone Allergy  Unknown Verified 05/05/17 05:54





   Reaction  





   Details  


 


SSRIS Allergy  Unknown Uncoded 05/05/17 05:54





   Reaction  





   Details  














PMH/Surg Hx/FS Hx/Imm Hx


Endocrine/Hematology History: 


   Denies: Hx Diabetes, Hx Systemic Lupus Erythematosus, Hx Thyroid Disease


Cardiovascular History: 


   Denies: Hx Hypertension


Respiratory History: 


   Denies: Hx Asthma, Hx Chronic Obstructive Pulmonary Disease (COPD)


GI History: 


   Denies: Hx Ulcer


 History: 


   Denies: Hx Renal Disease


Musculoskeletal History: 


   Denies: Hx Rheumatoid Arthritis


Psychiatric History: Reports: Hx Anxiety, Hx Eating Disorder, Hx Depression, Hx 

Panic Disorder, Hx Inpatient Treatment, Hx Community Mental Health Tx, Hx 

Bipolar Disorder, Hx Substance Abuse, Other Psychiatric Issues/Disorders


   Denies: Hx Attention Deficit Hyperactivity Disorder, Hx Post Traumatic 

Stress Disorder, Hx Schizophrenia, Hx Suicide Attempt, Hx of Violent Episodes 

Against Others





- Cancer History


Hx Chemotherapy: No


Hx Radiation Therapy: No


Hx Palliative Cancer Treatment: No





- Surgical History


Surgery Procedure, Year, and Place: 2001 - RIGHT Lymph nodes removed to biopsy 

thought she had cancer - she didn't.  2011- C section


Infectious Disease History: No


Infectious Disease History: 


   Denies: Hx Clostridium Difficile, Hx Hepatitis, Hx Human Immunodeficiency 

Virus (HIV), Hx of Known/Suspected MRSA, Hx Shingles, Hx Tuberculosis, Hx Known/

Suspected VRE, Hx Known/Suspected VRSA, History Other Infectious Disease, 

Traveled Outside the [place] in Last 30 Days





- Family History


Known Family History: Positive: Diabetes - Father , Other - CVA mother





- Social History


Alcohol Use: Rare


Hx Substance Use: Yes


Substance Use Type: Reports: Marijuana


Hx Tobacco Use: Yes


Smoking Status (MU): Heavy Every Day Tobacco Smoker


Type: Cigarettes





Review of Systems





- ROS Summary


Review of Systems Summary: 


Level 5 caveat due to uncooperative disposition. 


Positive: Other - Talking without stop and pacing the room


All Other Systems Reviewed And Are Negative: No





Physical Exam





- Summary


Physical Exam Summary: 


General: No pain distress, pressured speech, a little unkempt


Skin: Warm, Skin Color Reflects Adequate Perfusion, Dry


Eyes: EOMI, DELL


ENT: Pharynx normal, TMs normal


Neck: Supple, nontender


Respiratory: CTA, breath sounds present, no rhonchi, no wheezes, no rales


Cardiovascular: RRR, no murmur, no rub, no gallop


Musculoskeletal: LIANA, No edema


Neuro: Sensory/motor intact, A&Ox3, CN intact 2-12


Psych: Affect/mood appropriate





Triage Information Reviewed: Yes


Vital Signs On Initial Exam: 


 Initial Vitals











Temp Pulse Resp BP Pulse Ox


 


 98.2 F   88   20   154/100   100 


 


 05/27/17 18:02  05/27/17 18:02  05/27/17 18:02  05/27/17 18:02  05/27/17 18:02











Vital Signs Reviewed: Yes





- Fellows Coma Scale


Coma Scale Total: 15





Diagnostics





- Vital Signs


 Vital Signs











  Temp Pulse Resp BP Pulse Ox


 


 05/27/17 18:37    22  


 


 05/27/17 18:02  98.2 F  88  20  154/100  100














- Laboratory


Lab Results: 


 Lab Results











  05/27/17 05/27/17 Range/Units





  18:27 18:27 


 


Urine Color  Usha   


 


Urine Appearance  Cloudy   


 


Urine pH  6.0   (5-9)  


 


Ur Specific Gravity  1.021   (1.010-1.030)  


 


Urine Protein  1+(30 mg/dl) H   (Negative)  


 


Urine Ketones  1+ H   (Negative)  


 


Urine Blood  Negative   (Negative)  


 


Urine Nitrate  Negative   (Negative)  


 


Urine Bilirubin  Negative   (Negative)  


 


Urine Urobilinogen  Positive H   (Negative)  


 


Ur Leukocyte Esterase  Trace H   (Negative)  


 


Urine WBC (Auto)  1+(6-10/hpf) H   (Absent)  


 


Urine RBC (Auto)  3+(>10/hpf) H   (Absent)  


 


Ur Squamous Epith Cells  Present H   (Absent)  


 


Calcium Oxalate Crystal  Present H   (Absent)  


 


Urine Bacteria  Absent   (Absent)  


 


Urine Glucose  Negative   (Negative)  


 


Urine Opiates Screen   None detected  (None Detect)  


 


Ur Barbiturates Screen   None detected  (None Detect)  


 


Ur Phencyclidine Scrn   None detected  (None Detect)  


 


Ur Amphetamines Screen   None detected  (None Detect)  


 


U Benzodiazepines Scrn   None detected  (None Detect)  


 


Urine Cocaine Screen   None detected  (None Detect)  


 


U Cannabinoids Screen   Presumptive positive H  (None Detect)  











Result Diagrams: 


 05/27/17 18:04





 05/27/17 18:50


Lab Statement: Any lab studies that have been ordered have been reviewed, and 

results considered in the medical decision making process.





Course/Dx





- Course


Course Of Treatment: pt now awake talking to mental health evaluator and to be 

signed out to dr. jin





- Differential Dx/Clinical Impression


Provider Diagnosis: 


 Agitation requiring sedation protocol








Discharge





- Discharge Plan


Condition: Good


Disposition: OTHER


Discharge Disposition Comment: Pt will be signed out, pending dispo, awaiting 

MHE


Referrals: 


Gabrielle Villanueva MD [Primary Care Provider] - 





The documentation as recorded by the Emily linn Rebecca accurately 

reflects the service I personally performed and the decisions made by me, 

Monserrat Howell MD.

## 2017-05-28 RX ADMIN — CIPROFLOXACIN SCH MG: 500 TABLET, FILM COATED ORAL at 07:31

## 2017-05-28 RX ADMIN — THERA TABS SCH: TAB at 07:32

## 2017-05-28 RX ADMIN — CLONAZEPAM SCH MG: 0.5 TABLET ORAL at 07:31

## 2017-05-28 RX ADMIN — Medication SCH EACH: at 07:42

## 2017-05-28 RX ADMIN — OLANZAPINE PRN MG: 5 TABLET, FILM COATED ORAL at 19:33

## 2017-05-28 RX ADMIN — CLONAZEPAM SCH: 0.5 TABLET ORAL at 14:20

## 2017-05-28 RX ADMIN — HYDROXYZINE HYDROCHLORIDE PRN MG: 50 TABLET, FILM COATED ORAL at 11:59

## 2017-05-28 RX ADMIN — NICOTINE PRN MG: 4 INHALANT RESPIRATORY (INHALATION) at 07:42

## 2017-05-28 NOTE — HP
Amended report to enter cosignature on report.



INITIAL PSYCHIATRIC ASSESSMENT:

 

The supervising psychiatrist for this assessment is Dr. Paris.



DATE OF ADMISSION:  17

 

IDENTIFYING INFORMATION:  The patient is a 37-year-old white female admitted to 
this facility on 17.  Admitting status is 9.39.  The patient was seen and 
examined.  The chart was reviewed and the case was discussed with clinical 
staff available at the time of visit.

 

CHIEF COMPLAINT/REASON FOR ADMISSION:  The patient states "I have not slept in 
a few days, but my  took my children.  Wait, I will tell you what 
happened."

 

It should be noted that at this point that the patient was quite distressed 
throughout the clinical interview and was also quite tangential and verbose 
making psychiatric assessment quite difficult.

 

HISTORY OF PRESENT ILLNESS:  Apparently, the patient was brought to [facility] via ambulance.  According to emergency room documentation, on 
Friday at approximately 1 p.m., her  apparently took her daughter out of 
school.  She was searching for them today.  The patient informs me that she 
drove all over until she ran out of gas.  She states that when she finally did 
talk to her , he stated that he was going to keep the children away from 
her until she allegedly went for a mental health evaluation.  She did go for a 
mental health evaluation here at [facility] and was discharged 
earlier yesterday morning. Apparently, she was told after her evaluation that 
she should call the  to find out where her children are and when 
she did, there was no answer at which point her phone .  She said that CPS 
was also called because of her  pulling the children out of school 
without reason.  Then, she told me today that she feels that her  has 
been "going crazy" ever since the death of a relative a few weeks ago.  She 
alleges that she had a piece of the  persons skin preserved in the 
freezer because she stated "My  gave me a piece of skin inside of a post-
it note."

 

PSYCHIATRIC SYMPTOMS:  Flight of ideas.  The patient self-admitted not sleeping 
for several days, tangential thought processes.

 

PAST PSYCHIATRIC HISTORY:  The patient has a history of multiple inpatient 
psychiatric admissions, the most recent being here in 2014.  The 
patient does report that she received caring services at the [facility] where she sees Barb Hale and Liam is her therapist.  She 
carries a diagnosis of bipolar disorder and borderline personality disorder, 
currently denying suicidal ideation.

 

PAST MEDICAL HISTORY:  Positive for ovarian cyst which she reports was found on 
her left cyst a few weeks ago.  She is currently awaiting an appointment with 
her OB/GYN.

 

                               PHYSICAL EXAMINATION

 

GENERAL APPEARANCE:  The patient is well developed, well nourished, alert, 
cooperative appears to be in no acute distress at the time of the exam.

 

HEENT:  Head is normocephalic, atraumatic.

 

NECK:  Appears normal on inspection.

 

RESPIRATORY:  No respiratory distress.

 

CARDIAC:  Rate is 102.  Blood pressure 136/92.

 

ABDOMEN:  Symmetrical without distention or guarding.

 

MUSCULOSKELETAL:  The patient demonstrates full range of motion.

 

NEUROLOGIC:  The patient is alert and oriented to person, place, and time.

 

SKIN:  Intact.  Warm and dry.

 

 MENTAL STATUS EXAM:  The patient is of healthy build and appears older than 
her stated age with somewhat disheveled grooming noted.  On gross examination, 
she appears to have no physical deformities.  Attitude towards the examiner was 
cooperative.  She did not appear to be demonstrating any noteworthy mannerisms, 
gestures, or tics.  Activity level was consistent with psychomotor excitation 
as evidenced by the fact that the patient had difficulty remaining seated 
throughout the clinical interview.  Speech was verbose, uninterrupted, 
pressured at times. Self-reported mood is "alright."  However, her affect was 
actually quite labile. She became tearful at different times while talking 
about her children.  She is currently denying visual or auditory 
hallucinations.  No overt paranoid thought processes are appreciated.  
Delusional thought processes have not been ruled out; however, regarding 
whether or not she actually has a piece of a  relative's body in her 
freezer.  Judgment and insight are poor as is impulse control.  She is 
currently denying suicidal ideation.

 

REVIEW OF LABORATORY DATA:  Undertaken at this time, the following abnormals 
are noted:  CBC was within normal parameters.  Chemistry studies revealed low 
potassium at 3.2 and carbon dioxide of 20.  Urinalysis demonstrated 1+ urine 
protein, 1+ urine ketones, urine urobilinogen was positive, trace leukocyte 
esterases, urine wbc's 1+, urine rbc's 1+, urine squamous epithelial cells were 
present as were crystals.  Urine toxicology screen was presumptively positive 
for cannabinoids.

 

CLINICAL IMPRESSION:  The patient is a 37-year-old white female with a history 
of bipolar disorder who is currently admitted for manic symptoms.

 

ADMITTING DIAGNOSES:

1.  Bipolar disorder, current phase, manic.

2.  Rule out cannabis-induced mood disorder.

 

PLAN OF TREATMENT:  Admit to behavioral services unit.  Diet will be regular. 
Activity as tolerated with restrictions to the unit.  The patient will 
participate in treatment planning activities, individual, group, and milieu 
therapy as well as medication management sessions and discharge planning until 
she is stable or referred to a higher level of care.

 

TREATMENT GOAL:  Stabilization.

 

PROGNOSIS:  Fair.

 

ESTIMATED LENGTH OF STAY:  7 to 10 days.

 

DISCHARGE CRITERIA:  The patient will be discharged when she is no longer risk 
to herself or others and has met the criteria set forth by the treatment team 
for discharge.  This case was reviewed and discussed with Dr. Torres who has 
concurred with the assessment, clinical impression, and initial plan of care.

 

 ____________________________________ VICENTA LOZANO NP

 

[id number]/CPS #: [id number]

STEVEN

## 2017-05-29 RX ADMIN — IBUPROFEN PRN MG: 400 TABLET ORAL at 07:56

## 2017-05-29 RX ADMIN — NICOTINE POLACRILEX PRN MG: 2 GUM, CHEWING BUCCAL at 07:56

## 2017-05-29 RX ADMIN — CLONAZEPAM SCH MG: 0.5 TABLET ORAL at 14:13

## 2017-05-29 RX ADMIN — CLONAZEPAM SCH: 0.5 TABLET ORAL at 00:12

## 2017-05-29 RX ADMIN — NICOTINE PRN MG: 4 INHALANT RESPIRATORY (INHALATION) at 14:17

## 2017-05-29 RX ADMIN — OLANZAPINE PRN MG: 5 TABLET, FILM COATED ORAL at 22:01

## 2017-05-29 RX ADMIN — OLANZAPINE SCH MG: 5 TABLET, FILM COATED ORAL at 20:21

## 2017-05-29 RX ADMIN — CLONAZEPAM SCH MG: 0.5 TABLET ORAL at 21:37

## 2017-05-29 RX ADMIN — OXYCODONE HYDROCHLORIDE AND ACETAMINOPHEN PRN TAB: 5; 325 TABLET ORAL at 17:06

## 2017-05-29 RX ADMIN — CIPROFLOXACIN SCH MG: 500 TABLET, FILM COATED ORAL at 20:21

## 2017-05-29 RX ADMIN — HYDROXYZINE HYDROCHLORIDE PRN MG: 50 TABLET, FILM COATED ORAL at 22:00

## 2017-05-29 RX ADMIN — NICOTINE PRN MG: 4 INHALANT RESPIRATORY (INHALATION) at 18:55

## 2017-05-29 RX ADMIN — CIPROFLOXACIN SCH MG: 500 TABLET, FILM COATED ORAL at 07:56

## 2017-05-29 RX ADMIN — IBUPROFEN PRN MG: 400 TABLET ORAL at 00:12

## 2017-05-29 RX ADMIN — NICOTINE PRN MG: 4 INHALANT RESPIRATORY (INHALATION) at 08:03

## 2017-05-29 RX ADMIN — CIPROFLOXACIN SCH MG: 500 TABLET, FILM COATED ORAL at 00:12

## 2017-05-29 RX ADMIN — THERA TABS SCH: TAB at 08:00

## 2017-05-29 RX ADMIN — CLONAZEPAM SCH: 0.5 TABLET ORAL at 20:21

## 2017-05-29 RX ADMIN — NICOTINE PRN MG: 4 INHALANT RESPIRATORY (INHALATION) at 17:07

## 2017-05-29 RX ADMIN — OLANZAPINE SCH MG: 5 TABLET, FILM COATED ORAL at 21:36

## 2017-05-29 RX ADMIN — CLONAZEPAM SCH MG: 0.5 TABLET ORAL at 08:45

## 2017-05-29 RX ADMIN — HYDROXYZINE HYDROCHLORIDE PRN MG: 50 TABLET, FILM COATED ORAL at 20:21

## 2017-05-29 RX ADMIN — NICOTINE PRN MG: 4 INHALANT RESPIRATORY (INHALATION) at 00:12

## 2017-05-29 RX ADMIN — ACETAMINOPHEN PRN MG: 325 TABLET ORAL at 12:08

## 2017-05-29 RX ADMIN — HYDROXYZINE HYDROCHLORIDE PRN MG: 50 TABLET, FILM COATED ORAL at 15:33

## 2017-05-29 NOTE — PN
Subjective





- Subjective


Service Type: 33057 Hosp care 15 min low complexity


Subjective: 





Noni admits she is manic and needs psychiatric care, though she does not 

agree that she needs that care in this locked setting.  She reports that she 

knows her kids are safe after her  took them out of school without her 

knowledge, but she is still anxious about what is going on with her  and 

kids.  She clarified that she told her  that if he went out again to 

care for the kids of his brother who  recently in a motorcycle accident, 

then he should not come back home.  She said she thought he might have wanted 

to shoot himself in the head, but no longer thinks so.  She gave a detailed 

report of elaborate preparation of a package with her  brother-in-law's 

bloody motorcyle helmet and a piece of his skin to return to his family because 

the presence of these items in her home upset her.  She talked about the family 

being incredulous that she had walked 5 miles to a store in [place] instead of 

0.125 miles to a bar nearby.  She also reports ongoing GI concerns, including 

diverticulitis and a pending appointment for endoscopy and colonoscopy, and 

nonalcoholic fatty liver disease, as well as a 4 cm cyst on her left ovary 

causing L flank pain, these medical matters being under the care of Dr Hood [

sp?] at [facility].





She reports frustrated mood, denies suicidality, says she has not felt suicidal 

since before her last admission here.  She reports she has yearly manias in 

April or May.





Objective





- Appearance


Appearance: Healthy Appearing


Dysmorphic Features: No


Hygiene: Normal


Grooming: Fairly Well Kept





- Behavior


Psychomotor Activities: Normal


Exhibits Abnormal Movement: No





- Attitude and Relatedness


Attitude and Relatedness: Cooperative


Eye Contact: Good





- Speech


Quality: Pressured


Latencies: Short


Quantity: Copious





- Mood


Patient's Decription of Mood: "I'm actually a little frustrated by your staff, 

but I'm trying to keep my cool."





- Affect


Observed Affect: Labile


Affect Consistent with: Dysphoria





- Thought Process


Patient's Thought Process: Tangential


Thought Content: No Passive Death Wish, No Suicidal Planning, No Homicidal 

Ideation, No Paranoid Ideation





- Sensorium


Experiencing Hallucinations: No, Sensorium is Clear


Type of Hallucinations: Visual: No, Auditory: No, Command: No





- Level of Consciousness


Level of Consciousness: Alert


Orientation: Yes Intact, Yes Orientated to Time, Yes Orientated to Place, Yes 

Orientated to Person





- Impulse Control


Impulse Control: Intact





- Insight and Judgement


Insight and Judgement: Poor





- Group Participation


Particating in Group Activities: Yes


Group Participation Comments: some





- Medication Management


Medication Management Adherence: Yes





Assessment





- Assessment


Merits Inpatient Hospitalization: For Immediate Safety, For Stabilization, For 

Ongoing Evaluation, For Discharge Planning, Pending Safe DC Plan


Inpatient DSM-IV Dx: Bipolar Affective Disorder, MRE manic.  rule out cannabis-

induced mood disorder


Clinical Impression: 





Noni Siddiqui is a 37-year-old woman admitted due to disorganized thought and 

behavior demonstrated on a second presentation in the ED in the context of a 

history of bipolar disorder, raising a high likelihood of luis as the cause.  

She assesses herself as manic in fact.  Her story gets complicated in the 

details around he children and her .  Her  took her 3 children 

out of school without her knowledge, then demanded she get a MHE before being 

able to see the kids.  I was on-call for her first ED presentation, in which 

evaluators determined she was not at acute risk, so discharged her.  She 

returned presenting as more disorganized, so was admitted out of safety 

concerns for inability to adequately care for herself to avoid harm.  She has 

not stated any dangerous intent or plan in any evaluation thus far.  There was 

a recent death of her brother-in-law playing a role in recent events.  She told 

me today that her  had been spending too much time caring for his nieces/

nephews, and she had told him not to come back if he went out again to care for 

them, or at least this appears to be the gist of her somewhat disorganized 

report today.  In any case, matters are likely to be clarified by gathering 

collateral from her , and perhaps a family meeting.  I was told by ED 

evaluators during the admission process that they had tried unsuccessfully to 

contact him.  She also reports ongoing gynecologic and GI concerns with follow 

ups arranged by her PCP at [facility].





Plan





- Plan


Treatment Plan: 


Name: NONI SIDDIQUI                        


YOB: 1980                        


[id number]


[id number]








Gather collateral from family, [facility].  Clarify and order meds per 

reconciliation in progress by charge nurse Jr.  Encourage groups and milieu.  

Offer supportive listening.  Plan discharge per collateral information and 

hospital course, likely return of care at [facility] and f/u for gyn, GI issues.


Continued Medication Management: Continue Outpt Medication


Medications: 


 Current Medications





Acetaminophen (Tylenol Tab*)  650 mg PO Q4H PRN


   PRN Reason: PAIN or TEMP > 101 F


   Last Admin: 17 12:08 Dose:  650 mg


Al Hydrox/Mg Hydrox/Simethicone (Maalox Plus*)  30 ml PO Q4H PRN


   PRN Reason: INDIGESTION


Ciprofloxacin (Cipro Tab*)  500 mg PO BID [facility]


   Last Admin: 17 07:56 Dose:  500 mg


Clonazepam (Klonopin Tab(*))  0.5 mg PO TID [facility]


   Last Admin: 17 14:13 Dose:  0.5 mg


Device (Nicotine Mouth Piece*)  1 each INH .CARTRIDGE [facility]


   Last Admin: 17 07:42 Dose:  1 each


Hydroxyzine HCl (Atarax Tab*)  50 mg PO QID PRN


   PRN Reason: ANXIETY


   Last Admin: 17 11:59 Dose:  50 mg


Ibuprofen (Motrin Tab*)  400 mg PO Q6H PRN


   PRN Reason: PAIN


   Last Admin: 17 07:56 Dose:  400 mg


Multivitamins (Theragran Tab*)  1 tab PO DAILY [facility]


   Last Admin: 17 08:00 Dose:  Not Given


Nicotine (Nicotine Inhaler*)  10 mg INH Q2H PRN


   PRN Reason: CRAVING


   Last Admin: 17 08:03 Dose:  10 mg


Nicotine Polacrilex (Nicotine Gum*)  2 mg PO Q2H PRN


   PRN Reason: CRAVING


   Last Admin: 17 07:56 Dose:  2 mg


Olanzapine (Zyprexa Tab*)  5 mg PO DAILY PRN


   PRN Reason: AGITATION


   Last Admin: 17 19:33 Dose:  5 mg


Olanzapine (Zyprexa Tab*)  5 mg PO BEDTIME [facility]











- Discharge Plan


Discharge Plan: Outpatient Follow Up


Outpatient Program: [facility]

## 2017-05-30 RX ADMIN — OXYCODONE HYDROCHLORIDE AND ACETAMINOPHEN PRN TAB: 5; 325 TABLET ORAL at 07:23

## 2017-05-30 RX ADMIN — CLONAZEPAM SCH MG: 0.5 TABLET ORAL at 14:11

## 2017-05-30 RX ADMIN — OXYCODONE HYDROCHLORIDE AND ACETAMINOPHEN PRN TAB: 5; 325 TABLET ORAL at 16:20

## 2017-05-30 RX ADMIN — CLONAZEPAM SCH MG: 0.5 TABLET ORAL at 21:11

## 2017-05-30 RX ADMIN — CIPROFLOXACIN SCH MG: 500 TABLET, FILM COATED ORAL at 21:12

## 2017-05-30 RX ADMIN — ONDANSETRON PRN MG: 4 TABLET, ORALLY DISINTEGRATING ORAL at 05:46

## 2017-05-30 RX ADMIN — HYDROXYZINE HYDROCHLORIDE PRN MG: 50 TABLET, FILM COATED ORAL at 09:22

## 2017-05-30 RX ADMIN — OLANZAPINE PRN MG: 5 TABLET, FILM COATED ORAL at 13:10

## 2017-05-30 RX ADMIN — IBUPROFEN PRN MG: 400 TABLET ORAL at 13:12

## 2017-05-30 RX ADMIN — CLONAZEPAM SCH: 0.5 TABLET ORAL at 09:23

## 2017-05-30 RX ADMIN — CIPROFLOXACIN SCH MG: 500 TABLET, FILM COATED ORAL at 09:22

## 2017-05-30 RX ADMIN — HYDROXYZINE HYDROCHLORIDE PRN MG: 50 TABLET, FILM COATED ORAL at 22:26

## 2017-05-30 RX ADMIN — IBUPROFEN PRN MG: 400 TABLET ORAL at 22:26

## 2017-05-30 RX ADMIN — NICOTINE PRN MG: 4 INHALANT RESPIRATORY (INHALATION) at 09:43

## 2017-05-30 RX ADMIN — THERA TABS SCH: TAB at 09:23

## 2017-05-30 RX ADMIN — CLONAZEPAM SCH MG: 0.5 TABLET ORAL at 10:51

## 2017-05-30 RX ADMIN — HYDROXYZINE HYDROCHLORIDE PRN MG: 50 TABLET, FILM COATED ORAL at 16:37

## 2017-05-30 NOTE — PN
Subjective





- Subjective


Service Type: [id number] Hosp care 25 min moderate complexity


Subjective: 





Noni acknowledged being manic, but perseverates on actions by her , 

and her legal paperwork.


She agreed with idea of being in the hospital for now, and with her current 

medication regimen.


I met with Charmaine Justice from [facility] in rounds and we discussed case.











Objective





- Appearance


Appearance: Well Developed/Nourished


Hygiene: Normal


Grooming: Well Kept





- Behavior


Psychomotor Activities: Abnormal-Increased





- Attitude and Relatedness


Attitude and Relatedness: Irritable


Eye Contact: Good





- Speech


Quality: Pressured


Latencies: Short


Quantity: Copious





- Mood


Patient's Decription of Mood: "Okay"





- Affect


Observed Affect: Expansive


Affect Consistent with: Euthymia





- Thought Process


Patient's Thought Process: Disorganized, Over Inclusive - perseverative


Thought Content: No Passive Death Wish, No Suicidal Planning, No Homicidal 

Ideation, No Paranoid Ideation





- Sensorium


Experiencing Hallucinations: No, Sensorium is Clear





- Level of Consciousness


Level of Consciousness: Alert





- Impulse Control


Impulse Control: Tenuous





- Insight and Judgement


Insight and Judgement: Poor





Assessment





- Assessment


Merits Inpatient Hospitalization: For Stabilization, To Initiate Treatment, For 

Ongoing Evaluation, For Discharge Planning, Pending Safe DC Plan


Inpatient DSM-IV Dx: Bipolar Affective Disorder, MRE manic.  rule out cannabis-

induced mood disorder


Clinical Impression: 





37-year-old female with history of bipolar disorder, borderline personality 

considerations, multiple prior psychiatric hospitalizations.  She was admitted 

after being brought to the hospital ED by ambulance, where she was evaluated 

with impairing manic symptoms, and thought disorganization.   





Settling into the unit.


Continues off baseline and impaired with manic affective features.  Does not 

appear frankly psychotic but condition affects her thought process.





Collateral info from [facility] (discussed with Charmaine Justice 5/30) is that Noni 

has been manic this month, seen frequently in recent days, acting more erratica

, unreasonable, and agitated.





Medication mgt. is with Zyprexa.








Plan





- Plan


Treatment Plan: 


Name: NONI SIDDIQUI                        


YOB: 1980                        


[id number]


[id number]











Continued Medication Management: Start Medication


Medications: 


 Current Medications





Acetaminophen (Tylenol Tab*)  650 mg PO Q4H PRN


   PRN Reason: PAIN or TEMP > 101 F


   Last Admin: 05/29/17 12:08 Dose:  650 mg


Al Hydrox/Mg Hydrox/Simethicone (Maalox Plus*)  30 ml PO Q4H PRN


   PRN Reason: INDIGESTION


Ciprofloxacin (Cipro Tab*)  500 mg PO BID [facility]


   Last Admin: 05/30/17 09:22 Dose:  500 mg


Clonazepam (Klonopin Tab(*))  0.5 mg PO TID [facility]


   Last Admin: 05/30/17 09:23 Dose:  Not Given


Device (Nicotine Mouth Piece*)  1 each INH .CARTRIDGE [facility]


   Last Admin: 05/28/17 07:42 Dose:  1 each


Hydroxyzine HCl (Atarax Tab*)  50 mg PO QID PRN


   PRN Reason: ANXIETY


   Last Admin: 05/30/17 09:22 Dose:  50 mg


Ibuprofen (Motrin Tab*)  400 mg PO Q6H PRN


   PRN Reason: PAIN


   Last Admin: 05/29/17 07:56 Dose:  400 mg


Multivitamins (Theragran Tab*)  1 tab PO DAILY [facility]


   Last Admin: 05/30/17 09:23 Dose:  Not Given


Nicotine (Nicotine Inhaler*)  10 mg INH Q2H PRN


   PRN Reason: CRAVING


   Last Admin: 05/30/17 09:43 Dose:  10 mg


Nicotine Polacrilex (Nicotine Gum*)  2 mg PO Q2H PRN


   PRN Reason: CRAVING


   Last Admin: 05/29/17 07:56 Dose:  2 mg


Olanzapine (Zyprexa Tab*)  5 mg PO DAILY PRN


   PRN Reason: AGITATION


   Last Admin: 05/29/17 22:01 Dose:  5 mg


Olanzapine (Zyprexa Tab*)  5 mg PO BEDTIME [facility]


   Last Admin: 05/29/17 21:36 Dose:  5 mg


Ondansetron HCl (Zofran Odt Tab*)  4 mg PO TID PRN


   PRN Reason: NAUSEA


   Last Admin: 05/30/17 05:46 Dose:  4 mg


Oxycodone/Acetaminophen (Percocet 5/325 Tab*)  2 tab PO Q8HR PRN


   PRN Reason: PAIN - MODERATE TO SEVERE


   Last Admin: 05/30/17 07:23 Dose:  2 tab











- Discharge Plan


Discharge Plan: Outpatient Follow Up


Outpatient Program: [facility]

## 2017-05-31 PROCEDURE — GZHZZZZ GROUP PSYCHOTHERAPY: ICD-10-PCS | Performed by: PSYCHIATRY & NEUROLOGY

## 2017-05-31 RX ADMIN — ONDANSETRON PRN MG: 4 TABLET, ORALLY DISINTEGRATING ORAL at 16:16

## 2017-05-31 RX ADMIN — NICOTINE PRN MG: 4 INHALANT RESPIRATORY (INHALATION) at 08:59

## 2017-05-31 RX ADMIN — CIPROFLOXACIN SCH MG: 500 TABLET, FILM COATED ORAL at 08:56

## 2017-05-31 RX ADMIN — IBUPROFEN PRN MG: 400 TABLET ORAL at 16:12

## 2017-05-31 RX ADMIN — HYDROXYZINE HYDROCHLORIDE PRN MG: 25 TABLET, FILM COATED ORAL at 16:15

## 2017-05-31 RX ADMIN — THERA TABS SCH TAB: TAB at 08:56

## 2017-05-31 RX ADMIN — OLANZAPINE PRN MG: 5 TABLET, FILM COATED ORAL at 10:56

## 2017-05-31 RX ADMIN — CLONAZEPAM PRN MG: 0.5 TABLET ORAL at 13:14

## 2017-05-31 RX ADMIN — OXYCODONE HYDROCHLORIDE AND ACETAMINOPHEN PRN TAB: 5; 325 TABLET ORAL at 08:59

## 2017-05-31 RX ADMIN — CLONAZEPAM SCH MG: 0.5 TABLET ORAL at 08:56

## 2017-05-31 RX ADMIN — MENTHOL, METHYL SALICYLATE SCH APPLIC: 10; 15 CREAM TOPICAL at 19:57

## 2017-05-31 RX ADMIN — OXYCODONE HYDROCHLORIDE AND ACETAMINOPHEN PRN TAB: 5; 325 TABLET ORAL at 19:53

## 2017-05-31 RX ADMIN — MENTHOL, METHYL SALICYLATE SCH APPLIC: 10; 15 CREAM TOPICAL at 11:56

## 2017-05-31 RX ADMIN — CIPROFLOXACIN SCH MG: 500 TABLET, FILM COATED ORAL at 20:52

## 2017-05-31 RX ADMIN — OLANZAPINE SCH MG: 10 TABLET ORAL at 20:51

## 2017-05-31 NOTE — PN
Subjective





- Subjective


Service Type: [id number] Hosp care 15 min low complexity


Subjective: 





Noni was generally hostile and critical of personal interaction, and said 

only selected staff are interacting with her in a helpful way.


She spoke non stop, and framed my responses as "interruptions and rebuttals." 


She read from a list and had copious amounts of notes.  She readily agreed she 

is manic: "I get this way always in April or May!!!!"





I granted most of her requests (adjustment of hydroxyzine and clonazepam, 

providing Bengay, giving computer access).  I could not authorize staff passes 

due to my and staff concerns for her symptoms.  She agreed with the use of 

Zyprexa and with targeting 15mg dose.











Objective





- Appearance


Appearance: Well Developed/Nourished


Hygiene: Normal


Grooming: Fairly Well Kept





- Behavior


Psychomotor Activities: Abnormal-Increased





- Attitude and Relatedness


Attitude and Relatedness: Hostile


Eye Contact: Fair





- Speech


Quality: Pressured


Latencies: Short


Quantity: Copious





- Mood


Patient's Decription of Mood: "Angry"





- Affect


Observed Affect: Expansive


Affect Consistent with: Dysphoria





- Thought Process


Patient's Thought Process: Over Inclusive


Thought Content: No Passive Death Wish, No Suicidal Planning, No Homicidal 

Ideation, No Paranoid Ideation





- Sensorium


Experiencing Hallucinations: No, Sensorium is Clear





- Level of Consciousness


Level of Consciousness: Alert





- Impulse Control


Impulse Control: Tenuous





- Insight and Judgement


Insight and Judgement: Poor





Assessment





- Assessment


Merits Inpatient Hospitalization: For Immediate Safety, For Stabilization, To 

Initiate Treatment, For Ongoing Evaluation, For Discharge Planning


Inpatient DSM-IV Dx: Bipolar Affective Disorder, MRE manic.  rule out cannabis-

induced mood disorder


Clinical Impression: 





37-year-old female with history of bipolar disorder, borderline personality 

considerations, multiple prior psychiatric hospitalizations.  She was admitted 

after being brought to the hospital ED by ambulance, where she was evaluated 

with impairing manic symptoms, and thought disorganization.   





Continues symptomatic and off baseline with impairing manic affective features (

irritability, decreased sleep, pressure, hypergraphia, intrusiveness, poor 

boundaries).  Does not appear frankly psychotic but condition affects her 

thought process.





Collateral info from [facility] (discussed with Charmaine Justice 5/30) is that Noni 

has been manic this month, seen frequently in recent days, acting more erratic, 

fired her clinician, and was agitated.





Medication mgt. is with Zyprexa.








Plan





- Plan


Treatment Plan: 


Name: NONI SIDDIQUI                        


YOB: 1980                        


[id number]


[id number]











Continued Medication Management: Start Medication


Medications: 


 Current Medications





Acetaminophen (Tylenol Tab*)  650 mg PO Q4H PRN


   PRN Reason: PAIN or TEMP > 101 F


   Last Admin: 05/29/17 12:08 Dose:  650 mg


Al Hydrox/Mg Hydrox/Simethicone (Maalox Plus*)  30 ml PO Q4H PRN


   PRN Reason: INDIGESTION


Ciprofloxacin (Cipro Tab*)  500 mg PO BID [facility]


   Last Admin: 05/31/17 08:56 Dose:  500 mg


Clonazepam (Klonopin Tab(*))  0.5 mg PO TID PRN


   PRN Reason: ANXIETY


Device (Nicotine Mouth Piece*)  1 each INH .CARTRIDGE [facility]


   Last Admin: 05/28/17 07:42 Dose:  1 each


Hydroxyzine HCl (Atarax Tab*)  25 mg PO QID PRN


   PRN Reason: ANXIETY


Ibuprofen (Motrin Tab*)  400 mg PO Q6H PRN


   PRN Reason: PAIN


   Last Admin: 05/30/17 22:26 Dose:  400 mg


Multi-Ingredient Liniment/Rub (Arsen Barron*)  1 applic TOPICAL BID [facility]


Multivitamins (Theragran Tab*)  1 tab PO DAILY [facility]


   Last Admin: 05/31/17 08:56 Dose:  1 tab


Nicotine (Nicotine Inhaler*)  10 mg INH Q2H PRN


   PRN Reason: CRAVING


   Last Admin: 05/31/17 08:59 Dose:  10 mg


Nicotine Polacrilex (Nicotine Gum*)  2 mg PO Q2H PRN


   PRN Reason: CRAVING


   Last Admin: 05/29/17 07:56 Dose:  2 mg


Olanzapine (Zyprexa Tab*)  5 mg PO DAILY PRN


   PRN Reason: AGITATION


   Last Admin: 05/30/17 13:10 Dose:  5 mg


Olanzapine (Zyprexa Tab*)  10 mg PO BEDTIME [facility]


   Last Admin: 05/30/17 21:12 Dose:  10 mg


Ondansetron HCl (Zofran Odt Tab*)  4 mg PO TID PRN


   PRN Reason: NAUSEA


   Last Admin: 05/30/17 05:46 Dose:  4 mg


Oxycodone/Acetaminophen (Percocet 5/325 Tab*)  2 tab PO Q8HR PRN


   PRN Reason: PAIN - MODERATE TO SEVERE


   Last Admin: 05/31/17 08:59 Dose:  2 tab











- Discharge Plan


Discharge Plan: Outpatient Follow Up

## 2017-05-31 NOTE — PN
MHU: Group Therapy Note





- Service Type


Service Type: 18689 Psychotherapy 45/50 - Leigha presents as irritable, 

demanding and argumentative.  She remains upset with both stress from recent 

marital problems, including displacement from role of primary parent, as well 

as frustration with progress in hospital.  Clinical feedback encouraged 

establishing cooperative and productive alliance with staff.  Leigha was later 

apologetic for expressing her frustration and engaged in appropriate, 

productive conversation.

## 2017-06-01 RX ADMIN — NICOTINE PRN MG: 4 INHALANT RESPIRATORY (INHALATION) at 06:18

## 2017-06-01 RX ADMIN — CLONAZEPAM PRN MG: 0.5 TABLET ORAL at 10:54

## 2017-06-01 RX ADMIN — MENTHOL, METHYL SALICYLATE SCH APPLIC: 10; 15 CREAM TOPICAL at 07:48

## 2017-06-01 RX ADMIN — HYDROXYZINE HYDROCHLORIDE PRN MG: 25 TABLET, FILM COATED ORAL at 19:51

## 2017-06-01 RX ADMIN — HYDROXYZINE HYDROCHLORIDE PRN MG: 25 TABLET, FILM COATED ORAL at 13:42

## 2017-06-01 RX ADMIN — MENTHOL, METHYL SALICYLATE SCH APPLIC: 10; 15 CREAM TOPICAL at 22:31

## 2017-06-01 RX ADMIN — NICOTINE PRN MG: 4 INHALANT RESPIRATORY (INHALATION) at 13:42

## 2017-06-01 RX ADMIN — OXYCODONE HYDROCHLORIDE AND ACETAMINOPHEN PRN TAB: 5; 325 TABLET ORAL at 23:50

## 2017-06-01 RX ADMIN — THERA TABS SCH: TAB at 07:48

## 2017-06-01 RX ADMIN — CIPROFLOXACIN SCH MG: 500 TABLET, FILM COATED ORAL at 22:32

## 2017-06-01 RX ADMIN — HYDROXYZINE HYDROCHLORIDE PRN MG: 25 TABLET, FILM COATED ORAL at 06:17

## 2017-06-01 RX ADMIN — CLONAZEPAM PRN MG: 0.5 TABLET ORAL at 19:47

## 2017-06-01 RX ADMIN — Medication SCH EACH: at 08:46

## 2017-06-01 RX ADMIN — ONDANSETRON PRN MG: 4 TABLET, ORALLY DISINTEGRATING ORAL at 07:47

## 2017-06-01 RX ADMIN — NICOTINE POLACRILEX PRN MG: 2 GUM, CHEWING BUCCAL at 19:49

## 2017-06-01 RX ADMIN — OXYCODONE HYDROCHLORIDE AND ACETAMINOPHEN PRN TAB: 5; 325 TABLET ORAL at 16:10

## 2017-06-01 RX ADMIN — OXYCODONE HYDROCHLORIDE AND ACETAMINOPHEN PRN TAB: 5; 325 TABLET ORAL at 07:46

## 2017-06-01 RX ADMIN — CIPROFLOXACIN SCH MG: 500 TABLET, FILM COATED ORAL at 07:46

## 2017-06-01 RX ADMIN — NICOTINE PRN MG: 4 INHALANT RESPIRATORY (INHALATION) at 19:49

## 2017-06-01 RX ADMIN — IBUPROFEN PRN MG: 400 TABLET ORAL at 06:17

## 2017-06-01 RX ADMIN — OLANZAPINE SCH MG: 10 TABLET ORAL at 22:31

## 2017-06-01 RX ADMIN — IBUPROFEN PRN MG: 400 TABLET ORAL at 13:41

## 2017-06-01 RX ADMIN — NICOTINE PRN MG: 4 INHALANT RESPIRATORY (INHALATION) at 08:46

## 2017-06-01 RX ADMIN — WATER SCH: 100 INJECTION, SOLUTION INTRAVENOUS at 22:33

## 2017-06-01 RX ADMIN — Medication SCH EACH: at 06:17

## 2017-06-01 RX ADMIN — ONDANSETRON PRN MG: 4 TABLET, ORALLY DISINTEGRATING ORAL at 15:28

## 2017-06-01 NOTE — PN
Subjective





- Subjective


Service Type: 00539 Hosp care 15 min low complexity


Subjective: 





Noni spoke non stop. 


She reports getting served court papers and adjusting to it.


She expressed appreciation for comfort measures and privileges here, and 

apologized for "being rude... I have endometriosis and I'm on the rag!!!:





I elicited that she's basically okay taking the medication.  She hopes for a 

speedy discharge because she has a lot of things to organize " insurance for my 

kids, food stamps."








Objective





- Appearance


Appearance: Well Developed/Nourished


Hygiene: Normal


Grooming: Well Kept





- Behavior


Psychomotor Activities: Abnormal-Increased





- Attitude and Relatedness


Attitude and Relatedness: Minimally Cooperative


Eye Contact: Good





- Speech


Quality: Pressured


Latencies: Short


Quantity: Copious





- Mood


Patient's Decription of Mood: "Upset"





- Affect


Observed Affect: Expansive


Affect Consistent with: Dysphoria





- Thought Process


Patient's Thought Process: Disorganized, Over Inclusive


Thought Content: No Passive Death Wish, No Suicidal Planning, No Homicidal 

Ideation, No Paranoid Ideation





- Sensorium


Experiencing Hallucinations: No, Sensorium is Clear





- Level of Consciousness


Level of Consciousness: Agitated - mildly





- Impulse Control


Impulse Control: Tenuous





- Insight and Judgement


Insight and Judgement: Poor





Assessment





- Assessment


Merits Inpatient Hospitalization: For Stabilization, To Initiate Treatment, For 

Ongoing Evaluation, Consolidate Improvements, For Discharge Planning


Inpatient DSM-IV Dx: Bipolar Affective Disorder, MRE manic.  rule out cannabis-

induced mood disorder


Clinical Impression: 





37-year-old female with history of bipolar disorder, borderline personality 

considerations, multiple prior psychiatric hospitalizations.  She was admitted 

after being brought to the hospital ED by ambulance, where she was evaluated 

with impairing manic symptoms, and thought disorganization.   





Continues symptomatic and off baseline with impairing manic affective features (

irritability, decreased sleep, pressure, hypergraphia, intrusiveness, poor 

boundaries).  Some aspects are mildly improved, such as sleep overnight.  She 

does not appear frankly psychotic but condition affects her thought process.





Collateral info from [facility] (discussed with Charmaine Justice 5/30) was that Noni 

has been manic this month, seen frequently in recent days, acting more erratic, 

fired her clinician, and was agitated.





Medication mgt. is with Zyprexa.








Plan





- Plan


Treatment Plan: 


Name: NONI SIDDIQUI                        


YOB: 1980                        


[id number]


[id number]











Continued Medication Management: Start Medication


Medications: 


 Current Medications





Acetaminophen (Tylenol Tab*)  650 mg PO Q4H PRN


   PRN Reason: PAIN or TEMP > 101 F


   Last Admin: 05/29/17 12:08 Dose:  650 mg


Al Hydrox/Mg Hydrox/Simethicone (Maalox Plus*)  30 ml PO Q4H PRN


   PRN Reason: INDIGESTION


Ciprofloxacin (Cipro Tab*)  [facility]ID [facility]


   Last Admin: 06/01/17 07:46 Dose:  500 mg


Clonazepam (Klonopin Tab(*))  0.5 mg PO TID PRN


   PRN Reason: ANXIETY


   Last Admin: 06/01/17 10:54 Dose:  0.5 mg


Device (Nicotine Mouth Piece*)  1 each INH .CARTRIDGE [facility]


   Last Admin: 06/01/17 08:46 Dose:  1 each


Hydroxyzine HCl (Atarax Tab*)  25 mg PO QID PRN


   PRN Reason: ANXIETY


   Last Admin: 06/01/17 06:17 Dose:  25 mg


Ibuprofen (Motrin Tab*)  400 mg PO Q6H PRN


   PRN Reason: PAIN


   Last Admin: 06/01/17 06:17 Dose:  400 mg


Multi-Ingredient Liniment/Rub (Arsen Barron*)  1 applic [facility]ID [facility]


   Last Admin: 06/01/17 07:48 Dose:  1 applic


Multivitamins (Theragran Tab*)  1 tab PO DAILY [facility]


   Last Admin: 06/01/17 07:48 Dose:  Not Given


Nicotine (Nicotine Inhaler*)  10 mg INH Q2H PRN


   PRN Reason: CRAVING


   Last Admin: 06/01/17 08:46 Dose:  10 mg


Nicotine Polacrilex (Nicotine Gum*)  2 mg PO Q2H PRN


   PRN Reason: CRAVING


   Last Admin: 05/29/17 07:56 Dose:  2 mg


Olanzapine (Zyprexa Tab*)  5 mg PO DAILY PRN


   PRN Reason: AGITATION


   Last Admin: 05/31/17 10:56 Dose:  5 mg


Olanzapine (Zyprexa Tab*)  15 mg PO [facility]EDTIME [facility]


   Last Admin: 05/31/17 20:51 Dose:  15 mg


Ondansetron HCl (Zofran Odt Tab*)  4 mg PO TID PRN


   PRN Reason: NAUSEA


   Last Admin: 06/01/17 07:47 Dose:  4 mg


Oxycodone/Acetaminophen (Percocet 5/325 Tab*)  2 tab PO Q8HR PRN


   PRN Reason: PAIN - MODERATE TO SEVERE


   Last Admin: 06/01/17 07:46 Dose:  2 tab











- Discharge Plan


Discharge Plan: Outpatient Follow Up

## 2017-06-02 RX ADMIN — WATER SCH NOTE: 100 INJECTION, SOLUTION INTRAVENOUS at 19:44

## 2017-06-02 RX ADMIN — CIPROFLOXACIN SCH MG: 500 TABLET, FILM COATED ORAL at 09:28

## 2017-06-02 RX ADMIN — HYDROXYZINE HYDROCHLORIDE PRN MG: 25 TABLET, FILM COATED ORAL at 18:13

## 2017-06-02 RX ADMIN — NICOTINE SCH PATCH: 14 PATCH TRANSDERMAL at 09:28

## 2017-06-02 RX ADMIN — NICOTINE POLACRILEX PRN MG: 2 GUM, CHEWING BUCCAL at 09:31

## 2017-06-02 RX ADMIN — OXYCODONE HYDROCHLORIDE AND ACETAMINOPHEN PRN TAB: 5; 325 TABLET ORAL at 15:29

## 2017-06-02 RX ADMIN — MENTHOL, METHYL SALICYLATE SCH APPLIC: 10; 15 CREAM TOPICAL at 09:48

## 2017-06-02 RX ADMIN — IBUPROFEN PRN MG: 400 TABLET ORAL at 04:10

## 2017-06-02 RX ADMIN — NICOTINE PRN MG: 4 INHALANT RESPIRATORY (INHALATION) at 14:48

## 2017-06-02 RX ADMIN — IBUPROFEN PRN MG: 400 TABLET ORAL at 11:27

## 2017-06-02 RX ADMIN — OLANZAPINE PRN MG: 5 TABLET, FILM COATED ORAL at 01:10

## 2017-06-02 RX ADMIN — NICOTINE PRN MG: 4 INHALANT RESPIRATORY (INHALATION) at 11:26

## 2017-06-02 RX ADMIN — HYDROXYZINE HYDROCHLORIDE PRN MG: 25 TABLET, FILM COATED ORAL at 11:29

## 2017-06-02 RX ADMIN — CLONAZEPAM PRN MG: 0.5 TABLET ORAL at 09:27

## 2017-06-02 RX ADMIN — CLONAZEPAM PRN MG: 0.5 TABLET ORAL at 14:48

## 2017-06-02 RX ADMIN — CLONAZEPAM PRN MG: 0.5 TABLET ORAL at 20:37

## 2017-06-02 RX ADMIN — OLANZAPINE SCH MG: 10 TABLET ORAL at 19:42

## 2017-06-02 RX ADMIN — OXYCODONE HYDROCHLORIDE AND ACETAMINOPHEN PRN TAB: 5; 325 TABLET ORAL at 06:58

## 2017-06-02 RX ADMIN — MENTHOL, METHYL SALICYLATE SCH APPLIC: 10; 15 CREAM TOPICAL at 19:43

## 2017-06-02 RX ADMIN — ONDANSETRON PRN MG: 4 TABLET, ORALLY DISINTEGRATING ORAL at 06:58

## 2017-06-02 RX ADMIN — HYDROXYZINE HYDROCHLORIDE PRN MG: 25 TABLET, FILM COATED ORAL at 19:42

## 2017-06-02 RX ADMIN — IBUPROFEN PRN MG: 400 TABLET ORAL at 19:42

## 2017-06-02 RX ADMIN — CIPROFLOXACIN SCH MG: 500 TABLET, FILM COATED ORAL at 19:43

## 2017-06-02 RX ADMIN — THERA TABS SCH: TAB at 09:32

## 2017-06-02 NOTE — PN
MHU: Group Therapy Note





- Service Type


Service Type: 62161 Group Psychotherapy - Group Psychotherapy Note:  Leigha 

engaged in a hostile diaglogue with this writer, expressing contempt for "men 

making decisions" regarding her continued hospitalization and subsequent 

detachment from her children.  Her insight continues to be impaired regarding 

conduct culminating in her admission, as well as in her engagement with staff.

## 2017-06-02 NOTE — PN
Subjective





- Subjective


Service Type: [id number] Hosp care 15 min low complexity


Subjective: 





Noni was focused on discharge planning, saying she has to organize things for 

her kids (SW does not think she does) - she asked me if I has spoken "to the 

 and girl  leader.." - I had no idea what she was talking 

about.  She dismissed feedback that her condition requires more time in 

treatment here.











Objective





- Appearance


Appearance: Well Developed/Nourished


Hygiene: Normal


Grooming: Well Kept





- Behavior


Psychomotor Activities: Abnormal-Increased





- Attitude and Relatedness


Attitude and Relatedness: Irritable


Eye Contact: Good





- Speech


Quality: Pressured


Latencies: Short


Quantity: Copious





- Mood


Patient's Decription of Mood: "Angry"





- Affect


Observed Affect: Expansive


Affect Consistent with: Dysphoria





- Thought Process


Patient's Thought Process: Disorganized, Over Inclusive


Thought Content: No Passive Death Wish, No Suicidal Planning, No Homicidal 

Ideation, No Paranoid Ideation





- Sensorium


Experiencing Hallucinations: No, Sensorium is Clear





- Level of Consciousness


Level of Consciousness: Alert





- Impulse Control


Impulse Control: Tenuous





- Insight and Judgement


Insight and Judgement: Poor





Assessment





- Assessment


Merits Inpatient Hospitalization: For Stabilization, To Initiate Treatment, For 

Ongoing Evaluation, Consolidate Improvements, For Discharge Planning


Inpatient DSM-IV Dx: Bipolar Affective Disorder, MRE manic.  rule out cannabis-

induced mood disorder


Clinical Impression: 





37-year-old female with history of bipolar disorder, borderline personality 

considerations, multiple prior psychiatric hospitalizations.  She was admitted 

after being brought to the hospital ED by ambulance, where she was evaluated 

with impairing manic symptoms, and thought disorganization.   





Noni continues symptomatic and off baseline with impairing manic affective 

features (presenting symptoms of irritability, impaired/decreased sleep, 

pressure, hypergraphia, intrusiveness, poor boundaries).  Some aspects are 

mildly improved.  She does not appear frankly psychotic but condition affects 

her thought process nnd judgment.





Collateral info from [facility] (discussed with Charmaine Justice 5/30) was that Noni 

has been manic this month, seen frequently in recent days, acting more erratic, 

fired her clinician, and was agitated.





Medication mgt. is with Zyprexa, titrating to effect/tolerability.














Plan





- Plan


Treatment Plan: 


Name: NONI SIDDIQUI                        


YOB: 1980                        


[id number]


[id number]











Continued Medication Management: Start Medication


Medications: 


 Current Medications





Acetaminophen (Tylenol Tab*)  650 mg PO Q4H PRN


   PRN Reason: PAIN or TEMP > 101 F


   Last Admin: 05/29/17 12:08 Dose:  650 mg


Al Hydrox/Mg Hydrox/Simethicone (Maalox Plus*)  30 ml PO Q4H PRN


   PRN Reason: INDIGESTION


Ciprofloxacin (Cipro Tab*)  500 mg PO BID [facility]


   Last Admin: 06/02/17 09:28 Dose:  500 mg


Clonazepam (Klonopin Tab(*))  0.5 mg PO TID PRN


   PRN Reason: ANXIETY


   Last Admin: 06/02/17 09:27 Dose:  0.5 mg


Device (Nicotine Mouth Piece*)  1 each INH .CARTRIDGE [facility]


   Last Admin: 06/01/17 08:46 Dose:  1 each


Hydroxyzine HCl (Atarax Tab*)  25 mg PO QID PRN


   PRN Reason: ANXIETY


   Last Admin: 06/01/17 19:51 Dose:  25 mg


Ibuprofen (Motrin Tab*)  400 mg PO Q6H PRN


   PRN Reason: PAIN


   Last Admin: 06/02/17 04:10 Dose:  400 mg


Multi-Ingredient Liniment/Rub (Arsen Barron*)  1 applic TOPICAL BID [facility]


   Last Admin: 06/02/17 09:48 Dose:  1 applic


Multivitamins (Theragran Tab*)  1 tab PO DAILY [facility]


   Last Admin: 06/02/17 09:32 Dose:  Not Given


Nicotine (Nicotine Inhaler*)  10 mg INH Q2H PRN


   PRN Reason: CRAVING


   Last Admin: 06/01/17 19:49 Dose:  10 mg


Nicotine (Nicotine Patch 14 Mg/24 Hr*)  1 patch TRANSDERM DAILY [facility]


   Last Admin: 06/02/17 09:28 Dose:  1 patch


Nicotine Polacrilex (Nicotine Gum*)  2 mg PO Q2H PRN


   PRN Reason: CRAVING


   Last Admin: 06/02/17 09:31 Dose:  2 mg


Olanzapine (Zyprexa Tab*)  5 mg PO DAILY PRN


   PRN Reason: AGITATION


   Last Admin: 06/02/17 01:10 Dose:  5 mg


Olanzapine (Zyprexa Tab*)  15 mg PO BEDTIME [facility]


   Last Admin: 06/01/17 22:31 Dose:  15 mg


Ondansetron HCl (Zofran Odt Tab*)  4 mg PO TID PRN


   PRN Reason: NAUSEA


   Last Admin: 06/02/17 06:58 Dose:  4 mg


Oxycodone/Acetaminophen (Percocet 5/325 Tab*)  2 tab PO Q8HR PRN


   PRN Reason: PAIN - MODERATE TO SEVERE


   Last Admin: 06/02/17 06:58 Dose:  2 tab


Pharmacy Profile Note (Nicotine Patch Removal Note*)  1 note PATCH OFF [address]


   Last Admin: 06/01/17 22:33 Dose:  Not Given











- Discharge Plan


Discharge Plan: Outpatient Follow Up


Outpatient Program: [facility]

## 2017-06-03 RX ADMIN — ALUMINUM HYDROXIDE, MAGNESIUM HYDROXIDE, AND SIMETHICONE PRN ML: 200; 200; 20 SUSPENSION ORAL at 16:59

## 2017-06-03 RX ADMIN — NICOTINE PRN MG: 4 INHALANT RESPIRATORY (INHALATION) at 07:40

## 2017-06-03 RX ADMIN — MENTHOL, METHYL SALICYLATE SCH APPLIC: 10; 15 CREAM TOPICAL at 19:58

## 2017-06-03 RX ADMIN — ACETAMINOPHEN PRN MG: 325 TABLET ORAL at 11:03

## 2017-06-03 RX ADMIN — OXYCODONE HYDROCHLORIDE AND ACETAMINOPHEN PRN TAB: 5; 325 TABLET ORAL at 13:38

## 2017-06-03 RX ADMIN — NICOTINE SCH PATCH: 14 PATCH TRANSDERMAL at 09:12

## 2017-06-03 RX ADMIN — ONDANSETRON PRN MG: 4 TABLET, ORALLY DISINTEGRATING ORAL at 05:57

## 2017-06-03 RX ADMIN — OXYCODONE HYDROCHLORIDE AND ACETAMINOPHEN PRN TAB: 5; 325 TABLET ORAL at 22:45

## 2017-06-03 RX ADMIN — MENTHOL, METHYL SALICYLATE SCH APPLIC: 10; 15 CREAM TOPICAL at 09:14

## 2017-06-03 RX ADMIN — OLANZAPINE SCH MG: 10 TABLET ORAL at 19:57

## 2017-06-03 RX ADMIN — CLONAZEPAM PRN MG: 0.5 TABLET ORAL at 09:12

## 2017-06-03 RX ADMIN — CLONAZEPAM PRN MG: 0.5 TABLET ORAL at 21:10

## 2017-06-03 RX ADMIN — NICOTINE PRN MG: 4 INHALANT RESPIRATORY (INHALATION) at 19:59

## 2017-06-03 RX ADMIN — CIPROFLOXACIN SCH MG: 500 TABLET, FILM COATED ORAL at 09:14

## 2017-06-03 RX ADMIN — NICOTINE PRN MG: 4 INHALANT RESPIRATORY (INHALATION) at 16:59

## 2017-06-03 RX ADMIN — ALUMINUM HYDROXIDE, MAGNESIUM HYDROXIDE, AND SIMETHICONE PRN ML: 200; 200; 20 SUSPENSION ORAL at 20:00

## 2017-06-03 RX ADMIN — WATER SCH NOTE: 100 INJECTION, SOLUTION INTRAVENOUS at 22:19

## 2017-06-03 RX ADMIN — OXYCODONE HYDROCHLORIDE AND ACETAMINOPHEN PRN TAB: 5; 325 TABLET ORAL at 05:57

## 2017-06-03 RX ADMIN — HYDROXYZINE HYDROCHLORIDE PRN MG: 25 TABLET, FILM COATED ORAL at 16:59

## 2017-06-03 RX ADMIN — IBUPROFEN PRN MG: 400 TABLET ORAL at 19:55

## 2017-06-03 RX ADMIN — THERA TABS SCH: TAB at 09:15

## 2017-06-03 RX ADMIN — IBUPROFEN PRN MG: 400 TABLET ORAL at 07:42

## 2017-06-03 RX ADMIN — CIPROFLOXACIN SCH MG: 500 TABLET, FILM COATED ORAL at 19:58

## 2017-06-03 RX ADMIN — HYDROXYZINE HYDROCHLORIDE PRN MG: 25 TABLET, FILM COATED ORAL at 11:03

## 2017-06-04 RX ADMIN — ONDANSETRON PRN MG: 4 TABLET, ORALLY DISINTEGRATING ORAL at 10:04

## 2017-06-04 RX ADMIN — IBUPROFEN PRN MG: 400 TABLET ORAL at 09:39

## 2017-06-04 RX ADMIN — CIPROFLOXACIN SCH MG: 500 TABLET, FILM COATED ORAL at 21:00

## 2017-06-04 RX ADMIN — NICOTINE SCH: 14 PATCH TRANSDERMAL at 10:46

## 2017-06-04 RX ADMIN — THERA TABS SCH: TAB at 10:46

## 2017-06-04 RX ADMIN — HYDROXYZINE HYDROCHLORIDE PRN MG: 25 TABLET, FILM COATED ORAL at 16:03

## 2017-06-04 RX ADMIN — MENTHOL, METHYL SALICYLATE SCH APPLIC: 10; 15 CREAM TOPICAL at 20:58

## 2017-06-04 RX ADMIN — ALUMINUM HYDROXIDE, MAGNESIUM HYDROXIDE, AND SIMETHICONE PRN ML: 200; 200; 20 SUSPENSION ORAL at 09:36

## 2017-06-04 RX ADMIN — WATER SCH NOTE: 100 INJECTION, SOLUTION INTRAVENOUS at 21:01

## 2017-06-04 RX ADMIN — OLANZAPINE SCH MG: 10 TABLET ORAL at 21:00

## 2017-06-04 RX ADMIN — HYDROXYZINE HYDROCHLORIDE PRN MG: 25 TABLET, FILM COATED ORAL at 09:36

## 2017-06-04 RX ADMIN — CIPROFLOXACIN SCH MG: 500 TABLET, FILM COATED ORAL at 09:34

## 2017-06-04 RX ADMIN — MENTHOL, METHYL SALICYLATE SCH APPLIC: 10; 15 CREAM TOPICAL at 09:38

## 2017-06-04 RX ADMIN — OXYCODONE HYDROCHLORIDE AND ACETAMINOPHEN PRN TAB: 5; 325 TABLET ORAL at 06:26

## 2017-06-04 RX ADMIN — CLONAZEPAM PRN MG: 0.5 TABLET ORAL at 14:07

## 2017-06-04 RX ADMIN — NICOTINE PRN MG: 4 INHALANT RESPIRATORY (INHALATION) at 16:18

## 2017-06-04 RX ADMIN — OXYCODONE HYDROCHLORIDE AND ACETAMINOPHEN PRN TAB: 5; 325 TABLET ORAL at 16:03

## 2017-06-04 RX ADMIN — NICOTINE SCH PATCH: 14 PATCH TRANSDERMAL at 06:25

## 2017-06-04 RX ADMIN — NICOTINE PRN MG: 4 INHALANT RESPIRATORY (INHALATION) at 20:59

## 2017-06-04 RX ADMIN — IBUPROFEN PRN MG: 400 TABLET ORAL at 20:59

## 2017-06-04 RX ADMIN — CLONAZEPAM PRN MG: 0.5 TABLET ORAL at 18:44

## 2017-06-04 RX ADMIN — CLONAZEPAM PRN MG: 0.5 TABLET ORAL at 20:59

## 2017-06-04 RX ADMIN — NICOTINE PRN MG: 4 INHALANT RESPIRATORY (INHALATION) at 09:36

## 2017-06-04 RX ADMIN — NICOTINE PRN MG: 4 INHALANT RESPIRATORY (INHALATION) at 06:26

## 2017-06-05 RX ADMIN — ONDANSETRON PRN MG: 4 TABLET, ORALLY DISINTEGRATING ORAL at 08:00

## 2017-06-05 RX ADMIN — CIPROFLOXACIN SCH MG: 500 TABLET, FILM COATED ORAL at 21:34

## 2017-06-05 RX ADMIN — IBUPROFEN PRN MG: 400 TABLET ORAL at 09:14

## 2017-06-05 RX ADMIN — NICOTINE SCH PATCH: 14 PATCH TRANSDERMAL at 09:12

## 2017-06-05 RX ADMIN — OXYCODONE HYDROCHLORIDE AND ACETAMINOPHEN PRN TAB: 5; 325 TABLET ORAL at 21:34

## 2017-06-05 RX ADMIN — NICOTINE PRN MG: 4 INHALANT RESPIRATORY (INHALATION) at 09:56

## 2017-06-05 RX ADMIN — THERA TABS SCH: TAB at 08:02

## 2017-06-05 RX ADMIN — HYDROXYZINE HYDROCHLORIDE PRN MG: 25 TABLET, FILM COATED ORAL at 19:36

## 2017-06-05 RX ADMIN — CLONAZEPAM PRN MG: 0.5 TABLET ORAL at 11:57

## 2017-06-05 RX ADMIN — NICOTINE PRN MG: 4 INHALANT RESPIRATORY (INHALATION) at 04:53

## 2017-06-05 RX ADMIN — OXYCODONE HYDROCHLORIDE AND ACETAMINOPHEN PRN TAB: 5; 325 TABLET ORAL at 13:31

## 2017-06-05 RX ADMIN — CLONAZEPAM PRN MG: 0.5 TABLET ORAL at 08:01

## 2017-06-05 RX ADMIN — WATER SCH NOTE: 100 INJECTION, SOLUTION INTRAVENOUS at 21:35

## 2017-06-05 RX ADMIN — NICOTINE PRN MG: 4 INHALANT RESPIRATORY (INHALATION) at 13:32

## 2017-06-05 RX ADMIN — HYDROXYZINE HYDROCHLORIDE PRN MG: 25 TABLET, FILM COATED ORAL at 13:31

## 2017-06-05 RX ADMIN — HYDROXYZINE HYDROCHLORIDE PRN MG: 25 TABLET, FILM COATED ORAL at 06:43

## 2017-06-05 RX ADMIN — MENTHOL, METHYL SALICYLATE SCH APPLIC: 10; 15 CREAM TOPICAL at 21:36

## 2017-06-05 RX ADMIN — OXYCODONE HYDROCHLORIDE AND ACETAMINOPHEN PRN TAB: 5; 325 TABLET ORAL at 04:53

## 2017-06-05 RX ADMIN — IBUPROFEN PRN MG: 400 TABLET ORAL at 19:36

## 2017-06-05 RX ADMIN — HYDROXYZINE HYDROCHLORIDE PRN MG: 25 TABLET, FILM COATED ORAL at 17:05

## 2017-06-05 RX ADMIN — CIPROFLOXACIN SCH MG: 500 TABLET, FILM COATED ORAL at 08:01

## 2017-06-05 RX ADMIN — OLANZAPINE SCH MG: 10 TABLET ORAL at 21:34

## 2017-06-05 RX ADMIN — MENTHOL, METHYL SALICYLATE SCH APPLIC: 10; 15 CREAM TOPICAL at 09:13

## 2017-06-05 NOTE — PN
MHU: Group Therapy Note





- Service Type


Service Type: 43645 Psychotherapy 45/50 - Leigha met with her  Tom, as 

well as treatment team members including psychology and social work.  Leigha 

was irritable and angry throughout this morning's discussion, engaging in an 

angry and entitled fashion.  She was combative with her , initiating an 

argumentative dynamic that continued unabated despite staff efforts to redirect 

towards discharge planning.  When the session was terminated, Leigha became 

enraged, accusing Tom and staff of interfering with her parental role.  

Although Tom is anxious to have Leigha come back home, her symptoms presently 

indicate continuing inpatient status.

## 2017-06-05 NOTE — PN
Subjective





- Subjective


Service Type: [id number] Hosp care 15 min low complexity


Subjective: 





Noni had a meeting with SW, psychologist and her  this a.m. and it was 

contentious. 


The feedback from him is that she can not reasonably interact.





Her questions are around privileges here - off unit walks, expanded limits on 

chrocheting.


I let her know I am off service for several days and she was pleased, saying 

there are problems with our communication.











Objective





- Appearance


Appearance: Well Developed/Nourished


Hygiene: Normal


Grooming: Well Kept





- Behavior


Psychomotor Activities: Normal





- Attitude and Relatedness


Attitude and Relatedness: Minimally Cooperative


Eye Contact: Good





- Speech


Quality: Unpressured


Latencies: Short


Quantity: Appropriate





- Mood


Patient's Decription of Mood: "Irritable"





- Affect


Observed Affect: Tense


Affect Consistent with: Dysphoria





- Thought Process


Patient's Thought Process: Coherent, Goal Directed


Thought Content: No Passive Death Wish, No Suicidal Planning, No Homicidal 

Ideation, No Paranoid Ideation





- Sensorium


Experiencing Hallucinations: No, Sensorium is Clear





- Level of Consciousness


Level of Consciousness: Alert





- Impulse Control


Impulse Control: Intact





- Insight and Judgement


Insight and Judgement: Poor





Assessment





- Assessment


Merits Inpatient Hospitalization: For Stabilization, To Initiate Treatment, For 

Ongoing Evaluation, Consolidate Improvements, For Discharge Planning


Inpatient DSM-IV Dx: Bipolar Affective Disorder, MRE manic.  rule out cannabis-

induced mood disorder


Clinical Impression: 





37-year-old female with history of bipolar disorder, borderline personality 

considerations, multiple prior psychiatric hospitalizations.  She was admitted 

after being brought to the hospital ED by ambulance, where she was evaluated 

with impairing manic symptoms, and thought disorganization.   





Stabilizing here.


Noni is safe on checks and in basic behavioral control.





She has milder sypmtoms.  She remains off baseline but intensity of manic 

features is reduced, and her thinking is more organized.


Target symptoms are irritability, impaired/decreased sleep, pressure, 

hypergraphia, intrusiveness, poor boundaries. 


She has not been frankly psychotic but her condition affects her thought 

process and judgment.





Collateral info from [facility] (discussed with Charmaine Justice 5/30), was that Noni 

has been manic this month, seen frequently in recent days, acting more erratic, 

fired her clinician, and was agitated.





Medication mgt. is with Zyprexa, titrating to effect/tolerability.














Plan





- Plan


Treatment Plan: 


Name: NONI SIDDIQUI                        


YOB: 1980                        


[id number]


[id number]











Continued Medication Management: Start Medication


Medications: 


 Current Medications





Acetaminophen (Tylenol Tab*)  650 mg PO Q4H PRN


   PRN Reason: PAIN or TEMP > 101 F


   Last Admin: 06/03/17 11:03 Dose:  650 mg


Al Hydrox/Mg Hydrox/Simethicone (Maalox Plus*)  30 ml PO Q4H PRN


   PRN Reason: INDIGESTION


   Last Admin: 06/04/17 09:36 Dose:  30 ml


Ciprofloxacin (Cipro Tab*)  500 mg PO BID [facility]


   Last Admin: 06/05/17 08:01 Dose:  500 mg


Clonazepam (Klonopin Tab(*))  0.5 mg PO TID PRN


   PRN Reason: ANXIETY


   Last Admin: 06/05/17 11:57 Dose:  0.5 mg


Device (Nicotine Mouth Piece*)  1 each INH .CARTRIDGE [facility]


   Last Admin: 06/01/17 08:46 Dose:  1 each


Hydroxyzine HCl (Atarax Tab*)  25 mg PO QID PRN


   PRN Reason: ANXIETY


   Last Admin: 06/05/17 06:43 Dose:  25 mg


Ibuprofen (Motrin Tab*)  400 mg PO Q6H PRN


   PRN Reason: PAIN


   Last Admin: 06/05/17 09:14 Dose:  400 mg


Multi-Ingredient Liniment/Rub (Arsen Barron*)  1 applic TOPICAL BID [facility]


   Last Admin: 06/05/17 09:13 Dose:  1 applic


Multivitamins (Theragran Tab*)  1 tab PO DAILY [facility]


   Last Admin: 06/05/17 08:02 Dose:  Not Given


Nicotine (Nicotine Inhaler*)  10 mg INH Q2H PRN


   PRN Reason: CRAVING


   Last Admin: 06/05/17 09:56 Dose:  10 mg


Nicotine (Nicotine Patch 14 Mg/24 Hr*)  1 patch TRANSDERM DAILY [facility]


   Last Admin: 06/05/17 09:12 Dose:  1 patch


Nicotine Polacrilex (Nicotine Gum*)  2 mg PO Q2H PRN


   PRN Reason: CRAVING


   Last Admin: 06/02/17 09:31 Dose:  2 mg


Olanzapine (Zyprexa Tab*)  5 mg PO DAILY PRN


   PRN Reason: AGITATION


   Last Admin: 06/02/17 01:10 Dose:  5 mg


Olanzapine (Zyprexa Tab*)  15 mg PO BEDTIME [facility]


   Last Admin: 06/04/17 21:00 Dose:  15 mg


Ondansetron HCl (Zofran Odt Tab*)  4 mg PO TID PRN


   PRN Reason: NAUSEA


   Last Admin: 06/05/17 08:00 Dose:  4 mg


Oxycodone/Acetaminophen (Percocet 5/325 Tab*)  2 tab PO Q8HR PRN


   PRN Reason: PAIN - MODERATE TO SEVERE


   Last Admin: 06/05/17 04:53 Dose:  2 tab


Pharmacy Profile Note (Nicotine Patch Removal Note*)  1 note PATCH OFF 2100 [facility]


   Last Admin: 06/04/17 21:01 Dose:  1 note











- Discharge Plan


Discharge Plan: Outpatient Follow Up


Outpatient Program: [facility]

## 2017-06-06 RX ADMIN — MENTHOL, METHYL SALICYLATE SCH APPLIC: 10; 15 CREAM TOPICAL at 21:11

## 2017-06-06 RX ADMIN — CLONAZEPAM PRN MG: 0.5 TABLET ORAL at 10:58

## 2017-06-06 RX ADMIN — NICOTINE SCH PATCH: 14 PATCH TRANSDERMAL at 08:27

## 2017-06-06 RX ADMIN — ALUMINUM HYDROXIDE, MAGNESIUM HYDROXIDE, AND SIMETHICONE PRN ML: 200; 200; 20 SUSPENSION ORAL at 11:39

## 2017-06-06 RX ADMIN — IBUPROFEN PRN MG: 400 TABLET ORAL at 18:27

## 2017-06-06 RX ADMIN — OXYCODONE HYDROCHLORIDE AND ACETAMINOPHEN PRN TAB: 5; 325 TABLET ORAL at 08:29

## 2017-06-06 RX ADMIN — HYDROXYZINE HYDROCHLORIDE PRN MG: 25 TABLET, FILM COATED ORAL at 18:28

## 2017-06-06 RX ADMIN — MENTHOL, METHYL SALICYLATE SCH: 10; 15 CREAM TOPICAL at 09:07

## 2017-06-06 RX ADMIN — NICOTINE PRN MG: 4 INHALANT RESPIRATORY (INHALATION) at 08:31

## 2017-06-06 RX ADMIN — NICOTINE PRN MG: 4 INHALANT RESPIRATORY (INHALATION) at 21:15

## 2017-06-06 RX ADMIN — WATER SCH NOTE: 100 INJECTION, SOLUTION INTRAVENOUS at 21:12

## 2017-06-06 RX ADMIN — CLONAZEPAM PRN MG: 0.5 TABLET ORAL at 21:14

## 2017-06-06 RX ADMIN — IBUPROFEN PRN MG: 400 TABLET ORAL at 06:20

## 2017-06-06 RX ADMIN — CIPROFLOXACIN SCH MG: 500 TABLET, FILM COATED ORAL at 21:11

## 2017-06-06 RX ADMIN — THERA TABS SCH: TAB at 08:28

## 2017-06-06 RX ADMIN — CIPROFLOXACIN SCH MG: 500 TABLET, FILM COATED ORAL at 08:27

## 2017-06-06 RX ADMIN — OLANZAPINE SCH MG: 10 TABLET ORAL at 21:12

## 2017-06-06 RX ADMIN — OXYCODONE HYDROCHLORIDE AND ACETAMINOPHEN PRN TAB: 5; 325 TABLET ORAL at 16:44

## 2017-06-06 RX ADMIN — IBUPROFEN PRN MG: 400 TABLET ORAL at 11:38

## 2017-06-06 RX ADMIN — HYDROXYZINE HYDROCHLORIDE PRN MG: 25 TABLET, FILM COATED ORAL at 14:38

## 2017-06-07 RX ADMIN — NICOTINE SCH PATCH: 14 PATCH TRANSDERMAL at 09:54

## 2017-06-07 RX ADMIN — OLANZAPINE SCH MG: 10 TABLET ORAL at 21:54

## 2017-06-07 RX ADMIN — CLONAZEPAM PRN MG: 0.5 TABLET ORAL at 10:30

## 2017-06-07 RX ADMIN — OXYCODONE HYDROCHLORIDE PRN MG: 5 CAPSULE ORAL at 21:52

## 2017-06-07 RX ADMIN — NICOTINE PRN MG: 4 INHALANT RESPIRATORY (INHALATION) at 21:56

## 2017-06-07 RX ADMIN — THERA TABS SCH: TAB at 09:54

## 2017-06-07 RX ADMIN — CIPROFLOXACIN SCH MG: 500 TABLET, FILM COATED ORAL at 09:55

## 2017-06-07 RX ADMIN — NICOTINE PRN MG: 4 INHALANT RESPIRATORY (INHALATION) at 06:25

## 2017-06-07 RX ADMIN — HYDROXYZINE HYDROCHLORIDE PRN MG: 25 TABLET, FILM COATED ORAL at 06:25

## 2017-06-07 RX ADMIN — MENTHOL, METHYL SALICYLATE SCH: 10; 15 CREAM TOPICAL at 09:55

## 2017-06-07 RX ADMIN — IBUPROFEN PRN MG: 400 TABLET ORAL at 09:57

## 2017-06-07 RX ADMIN — DICYCLOMINE HYDROCHLORIDE PRN MG: 10 CAPSULE ORAL at 19:27

## 2017-06-07 RX ADMIN — ACETAMINOPHEN PRN MG: 325 TABLET ORAL at 21:52

## 2017-06-07 RX ADMIN — MENTHOL, METHYL SALICYLATE SCH: 10; 15 CREAM TOPICAL at 21:53

## 2017-06-07 RX ADMIN — WATER SCH: 100 INJECTION, SOLUTION INTRAVENOUS at 21:53

## 2017-06-07 RX ADMIN — OXYCODONE HYDROCHLORIDE AND ACETAMINOPHEN PRN TAB: 5; 325 TABLET ORAL at 06:25

## 2017-06-07 RX ADMIN — IBUPROFEN PRN MG: 400 TABLET ORAL at 19:33

## 2017-06-07 RX ADMIN — OXYCODONE HYDROCHLORIDE AND ACETAMINOPHEN PRN TAB: 5; 325 TABLET ORAL at 14:28

## 2017-06-07 RX ADMIN — CLONAZEPAM PRN MG: 0.5 TABLET ORAL at 19:33

## 2017-06-07 NOTE — PN
Subjective





- Subjective


Service Type: [id number] Hosp care 35 min high complexity


Subjective: 





Met with patient Leigha, psychologist Dr Orosco,  Karine Aguirre, and 

nurse My Campo.  


Leigha continues to press for discharge, citing her children's need of her.  

She would like to discharge to the home she shares with her  and 

children.  She acknowledges that the meeting with her  on Monday did not 

go well.  





Leigha asks for staff pass, expansion of crocheting privileges, and permission 

to use her home-made rice heating pack.  She accepted that we would discuss 

staff pass again tomorrow on treatment team.  She accepted that the issue of 

crocheting privileges had been discussed on treatment team already with Dr Lunsford, and that we would keep to the times of 2-3 pm and 6-7 pm.  She 

expressed concern that she wants more time to engage in mindfulness exercises 

similar to the function of crocheting for her.  I will place a provider to 

nurse communication order indicating I approve of the use of her home-made 

heating pack, so long as it is not heated in a microwave used for patient food, 

but instead is heated in the dryer.





We discussed Leigha's agitation in response to having her room moved yesterday 

morning without being informed of it.  She said she understood she was being 

moved to a single room because she had been disturbing other patients while 

sleepwalking.  We agreed to inform her of a move before it occurs if this is 

necessary again.





Leigha reiterated multiple medical complaints, and complaints of not having had 

her medical complaints addressed.  Most complaints were of missed follow-ups 

for sub-acute and chronic issues, eg missing scheduled scoping above and below 

at [facility] for GI issues.   When directed to focus on acute concerns, her 

complaint was limited to increased abdominal distension and pain while here.  I 

spoke with her GI NP Nallely Evans, who recommended restarting omeprazole 

40 mg daily on an empty stomach and Bentyl 20 mg tid prn.   I spoke last night 

with her PCP Gabrielle Villanueva MD, who reported that she did not know of any acute 

concerns raised in their last meeting that would need immediate follow up here 

at [facility].  Dr Villanueva found that she was to receive a 10 day course of 

Ciprofloxacin for a question of diverticulitis: she has received a 10 day 

course here.  Leigha stated that she preferred not to pursue hospitalist 

consultation for her complaints of chronic and sub-acute GI and Ob/Gyn issues, 

that she would try restarting the omeprazole and Bentyl as recommended by Nurse 

Nathan, and would let us know how this affects her abdominal distension and 

pain.  She also complained of constipation, a sharp change from preceding 

watery diarrhea.





I also reviewed with Leigha her current medications.  She asked that the 

Percocet dose be changed from q8 to q6 hours with only the oxycodone component, 

and that clonazepam and hydroxyzine be checked to ensure they were available 

PRN rather than standing.  





She refused to restart lithium, saying it caused excessive thirst resulting in 

overconsumption of soda, and fatigue resulting in an obligatory return to bed 

at 11:30 am daily after getting up at 6:30 to get her kids ready for the day.  

She refused also a trial of Depakote, but could not recall specific adverse 

reaction.





Leigha reported feeling uncertain if she has had luis.  I gave her a patient 

information brochure to help clarify to her the symptoms of the illness.  She 

reports that she suffers primarily from borderline personality disorder.





Suzy Hale NP of [facility] reports that Leigha stopped lithium about 2 months 

ago.  She took Depakote [id number] at a dose increased to 2000 mg daily in 

October 2012.  Suzy also reports she has not been coming in for care with any 

regularity.





Objective





- Appearance


Appearance: Well Developed/Nourished


Dysmorphic Features: No


Hygiene: Normal


Grooming: Fairly Well Kept





- Behavior


Psychomotor Activities: Abnormal-Increased


Exhibits Abnormal Movement: No





- Attitude and Relatedness


Attitude and Relatedness: Superficially Cooperative


Eye Contact: Good





- Speech


Quality: Pressured


Latencies: Short


Quantity: Copious





- Mood


Patient's Decription of Mood: "Upset"





- Affect


Observed Affect: Labile


Affect Consistent with: Dysphoria





- Thought Process


Patient's Thought Process: Circumstantial


Thought Content: No Passive Death Wish, No Suicidal Planning, No Homicidal 

Ideation, No Paranoid Ideation





- Sensorium


Experiencing Hallucinations: No, Sensorium is Clear


Type of Hallucinations: Visual: No, Auditory: No, Command: No





- Level of Consciousness


Level of Consciousness: Alert


Orientation: Yes Intact, Yes Orientated to Time, Yes Orientated to Place, Yes 

Orientated to Person





- Impulse Control


Impulse Control: Tenuous





- Insight and Judgement


Insight and Judgement: Poor





- Group Participation


Particating in Group Activities: Yes





- Medication Management


Medication Management Adherence: Yes





Assessment





- Assessment


Merits Inpatient Hospitalization: For Stabilization, Consolidate Improvements, 

For Discharge Planning


Inpatient DSM-IV Dx: Bipolar Affective Disorder, MRE manic.  rule out cannabis-

induced mood disorder


Clinical Impression: 





5.29.17


Leigha Siddiqui is a 37-year-old woman admitted due to disorganized thought and 

behavior demonstrated on a second presentation in the ED in the context of a 

history of bipolar disorder, raising a high likelihood of luis as the cause.  

She assesses herself as manic in fact.  Her story gets complicated in the 

details around he children and her .  Her  took her 3 children 

out of school without her knowledge, then demanded she get a MHE before being 

able to see the kids.  I was on-call for her first ED presentation, in which 

evaluators determined she was not at acute risk, so we discharged her.  She 

returned presenting as more disorganized, so was admitted out of safety 

concerns for inability to adequately care for herself to avoid harm.  She has 

not stated any dangerous intent or plan in any evaluation thus far.  There was 

a recent death of her brother-in-law playing a role in recent events.  She told 

me today that her  had been spending too much time caring for his nieces/

nephews, and she had told him not to come back if he went out again to care for 

them, or at least this appears to be the gist of her somewhat disorganized 

report today.  In any case, matters are likely to be clarified by gathering 

collateral from her , and perhaps a family meeting.  I was told by ED 

evaluators during the admission process that they had tried unsuccessfully to 

contact him.  She also reports ongoing gynecologic and GI concerns with follow 

ups arranged by her PCP at [facility].





6.7.17


Care resumed from Dr Lunsford 6.6.17.  Leigha continues to show signs of luis, 

refuses lithium or Depakote.  Will address multiple medical issues outpatient 

except for single specific acute complaint of increased abdominal distension 

and pain: she agrees to try restarting antispasmodic and PPI against this per 

her GI NP, hold off on hospitalist consult unless pain/distension persists (she 

has been afebrile throughout).  Will need to confirm with  that her home 

is a safe place as regards residual luis for her  and her family before 

arranging discharge.  





Plan





- Plan


Treatment Plan: 


Name: LEIGHA SIDDIQUI                        


YOB: 1980                        


[id number]


[id number]








D/C antibiotic Ciprofloxacin with 10 day course complete as per Dr Braswell.    

Add antispasmodic Bentyl and PPI omeprazole against GI pain.  Encourage groups 

and milieu.  Offer supportive listening.  Plan discharge per collateral 

information and hospital course, likely return of care at [facility] and f/u for gyn

, GI issues.  Discharge depends on family preparedness for Leigah's return home

, and their sense of safety with any residual manic symptoms.  Ordered computer 

time to work on resume.  Clarified crocheting from 2-3 pm and 6-7 pm only.  

Ordered to allow use of her hot pack for pain.


Continued Medication Management: Different Medication


Medications: 


 Current Medications





Acetaminophen (Tylenol Tab*)  650 mg PO Q4H PRN


   PRN Reason: PAIN or TEMP > 101 F


   Last Admin: 06/03/17 11:03 Dose:  650 mg


Al Hydrox/Mg Hydrox/Simethicone (Maalox Plus*)  30 ml PO Q4H PRN


   PRN Reason: INDIGESTION


   Last Admin: 06/06/17 11:39 Dose:  30 ml


Ciprofloxacin (Cipro Tab*)  500 mg PO BID [facility]


   Last Admin: 06/07/17 09:55 Dose:  500 mg


Clonazepam (Klonopin Tab(*))  0.5 mg PO TID PRN


   PRN Reason: ANXIETY


   Last Admin: 06/07/17 10:30 Dose:  0.5 mg


Device (Nicotine Mouth Piece*)  1 each INH .CARTRIDGE [facility]


   Last Admin: 06/01/17 08:46 Dose:  1 each


Hydroxyzine HCl (Atarax Tab*)  25 mg PO QID PRN


   PRN Reason: ANXIETY


   Last Admin: 06/07/17 06:25 Dose:  25 mg


Ibuprofen (Motrin Tab*)  400 mg PO Q6H PRN


   PRN Reason: PAIN


   Last Admin: 06/07/17 09:57 Dose:  400 mg


Multi-Ingredient Liniment/Rub (Arsen Barron*)  1 applic TOPICAL BID [facility]


   Last Admin: 06/07/17 09:55 Dose:  Not Given


Multivitamins (Theragran Tab*)  1 tab PO DAILY [facility]


   Last Admin: 06/07/17 09:54 Dose:  Not Given


Nicotine (Nicotine Inhaler*)  10 mg INH Q2H PRN


   PRN Reason: CRAVING


   Last Admin: 06/07/17 06:25 Dose:  10 mg


Nicotine (Nicotine Patch 14 Mg/24 Hr*)  1 patch TRANSDERM DAILY [facility]


   Last Admin: 06/07/17 09:54 Dose:  1 patch


Nicotine Polacrilex (Nicotine Gum*)  2 mg PO Q2H PRN


   PRN Reason: CRAVING


   Last Admin: 06/02/17 09:31 Dose:  2 mg


Olanzapine (Zyprexa Tab*)  5 mg PO DAILY PRN


   PRN Reason: AGITATION


   Last Admin: 06/02/17 01:10 Dose:  5 mg


Olanzapine (Zyprexa Tab*)  20 mg PO BEDTIME [facility]


   Last Admin: 06/06/17 21:12 Dose:  20 mg


Ondansetron HCl (Zofran Odt Tab*)  4 mg PO TID PRN


   PRN Reason: NAUSEA


   Last Admin: 06/05/17 08:00 Dose:  4 mg


Oxycodone/Acetaminophen (Percocet 5/325 Tab*)  2 tab PO Q8HR PRN


   PRN Reason: PAIN - MODERATE TO SEVERE


   Last Admin: 06/07/17 06:25 Dose:  2 tab


Pharmacy Profile Note (Nicotine Patch Removal Note*)  1 note PATCH OFF 2100 [facility]


   Last Admin: 06/06/17 21:12 Dose:  1 note











- Discharge Plan


Discharge Plan: Outpatient Follow Up


Outpatient Program: [facility]

## 2017-06-08 RX ADMIN — HYDROXYZINE HYDROCHLORIDE PRN MG: 25 TABLET, FILM COATED ORAL at 21:17

## 2017-06-08 RX ADMIN — THERA TABS SCH: TAB at 07:50

## 2017-06-08 RX ADMIN — NICOTINE SCH PATCH: 14 PATCH TRANSDERMAL at 07:51

## 2017-06-08 RX ADMIN — HYDROXYZINE HYDROCHLORIDE PRN MG: 25 TABLET, FILM COATED ORAL at 18:06

## 2017-06-08 RX ADMIN — CLONAZEPAM PRN MG: 0.5 TABLET ORAL at 20:10

## 2017-06-08 RX ADMIN — ACETAMINOPHEN PRN MG: 325 TABLET ORAL at 12:22

## 2017-06-08 RX ADMIN — DICYCLOMINE HYDROCHLORIDE PRN MG: 10 CAPSULE ORAL at 07:53

## 2017-06-08 RX ADMIN — MENTHOL, METHYL SALICYLATE SCH: 10; 15 CREAM TOPICAL at 20:05

## 2017-06-08 RX ADMIN — ONDANSETRON PRN MG: 4 TABLET, ORALLY DISINTEGRATING ORAL at 07:58

## 2017-06-08 RX ADMIN — IBUPROFEN PRN MG: 400 TABLET ORAL at 10:03

## 2017-06-08 RX ADMIN — NICOTINE PRN MG: 4 INHALANT RESPIRATORY (INHALATION) at 21:19

## 2017-06-08 RX ADMIN — CLONAZEPAM PRN MG: 0.5 TABLET ORAL at 12:23

## 2017-06-08 RX ADMIN — ONDANSETRON PRN MG: 4 TABLET, ORALLY DISINTEGRATING ORAL at 14:01

## 2017-06-08 RX ADMIN — NICOTINE PRN MG: 4 INHALANT RESPIRATORY (INHALATION) at 07:56

## 2017-06-08 RX ADMIN — OLANZAPINE PRN MG: 5 TABLET, FILM COATED ORAL at 13:08

## 2017-06-08 RX ADMIN — DICYCLOMINE HYDROCHLORIDE PRN MG: 10 CAPSULE ORAL at 17:09

## 2017-06-08 RX ADMIN — DICYCLOMINE HYDROCHLORIDE PRN MG: 10 CAPSULE ORAL at 22:25

## 2017-06-08 RX ADMIN — OLANZAPINE SCH MG: 10 TABLET ORAL at 22:25

## 2017-06-08 RX ADMIN — Medication SCH EACH: at 07:56

## 2017-06-08 RX ADMIN — HYDROXYZINE HYDROCHLORIDE PRN MG: 25 TABLET, FILM COATED ORAL at 07:56

## 2017-06-08 RX ADMIN — WATER SCH: 100 INJECTION, SOLUTION INTRAVENOUS at 21:18

## 2017-06-08 RX ADMIN — OXYCODONE HYDROCHLORIDE PRN MG: 5 CAPSULE ORAL at 20:07

## 2017-06-08 RX ADMIN — DICYCLOMINE HYDROCHLORIDE PRN MG: 10 CAPSULE ORAL at 14:02

## 2017-06-08 RX ADMIN — NICOTINE SCH PATCH: 21 PATCH TRANSDERMAL at 12:20

## 2017-06-08 RX ADMIN — OXYCODONE HYDROCHLORIDE PRN MG: 5 CAPSULE ORAL at 07:56

## 2017-06-08 RX ADMIN — OMEPRAZOLE SCH MG: 20 CAPSULE, DELAYED RELEASE ORAL at 06:26

## 2017-06-08 RX ADMIN — OXYCODONE HYDROCHLORIDE PRN MG: 5 CAPSULE ORAL at 14:02

## 2017-06-08 RX ADMIN — MENTHOL, METHYL SALICYLATE SCH: 10; 15 CREAM TOPICAL at 07:50

## 2017-06-08 RX ADMIN — DOCUSATE SODIUM PRN MG: 100 CAPSULE, LIQUID FILLED ORAL at 12:21

## 2017-06-09 RX ADMIN — HYDROXYZINE HYDROCHLORIDE PRN MG: 25 TABLET, FILM COATED ORAL at 22:39

## 2017-06-09 RX ADMIN — ACETAMINOPHEN PRN MG: 325 TABLET ORAL at 21:33

## 2017-06-09 RX ADMIN — MENTHOL, METHYL SALICYLATE SCH APPLIC: 10; 15 CREAM TOPICAL at 09:26

## 2017-06-09 RX ADMIN — OXYCODONE HYDROCHLORIDE PRN MG: 5 CAPSULE ORAL at 18:56

## 2017-06-09 RX ADMIN — CLONAZEPAM PRN MG: 0.5 TABLET ORAL at 16:16

## 2017-06-09 RX ADMIN — OXYCODONE HYDROCHLORIDE PRN MG: 5 CAPSULE ORAL at 07:00

## 2017-06-09 RX ADMIN — DICYCLOMINE HYDROCHLORIDE PRN MG: 10 CAPSULE ORAL at 21:33

## 2017-06-09 RX ADMIN — IBUPROFEN PRN MG: 400 TABLET ORAL at 22:39

## 2017-06-09 RX ADMIN — IBUPROFEN PRN MG: 400 TABLET ORAL at 00:45

## 2017-06-09 RX ADMIN — DICYCLOMINE HYDROCHLORIDE PRN MG: 10 CAPSULE ORAL at 09:24

## 2017-06-09 RX ADMIN — HYDROXYZINE HYDROCHLORIDE PRN MG: 25 TABLET, FILM COATED ORAL at 07:00

## 2017-06-09 RX ADMIN — NICOTINE PRN MG: 4 INHALANT RESPIRATORY (INHALATION) at 07:00

## 2017-06-09 RX ADMIN — IBUPROFEN PRN MG: 400 TABLET ORAL at 16:16

## 2017-06-09 RX ADMIN — THERA TABS SCH: TAB at 08:03

## 2017-06-09 RX ADMIN — DICYCLOMINE HYDROCHLORIDE PRN MG: 10 CAPSULE ORAL at 16:17

## 2017-06-09 RX ADMIN — DOCUSATE SODIUM PRN MG: 100 CAPSULE, LIQUID FILLED ORAL at 07:00

## 2017-06-09 RX ADMIN — ONDANSETRON PRN MG: 4 TABLET, ORALLY DISINTEGRATING ORAL at 09:31

## 2017-06-09 RX ADMIN — OMEPRAZOLE SCH MG: 20 CAPSULE, DELAYED RELEASE ORAL at 05:55

## 2017-06-09 RX ADMIN — NICOTINE SCH PATCH: 21 PATCH TRANSDERMAL at 08:01

## 2017-06-09 RX ADMIN — OLANZAPINE SCH MG: 10 TABLET ORAL at 21:26

## 2017-06-09 RX ADMIN — MENTHOL, METHYL SALICYLATE SCH: 10; 15 CREAM TOPICAL at 08:31

## 2017-06-09 RX ADMIN — HYDROXYZINE HYDROCHLORIDE PRN MG: 25 TABLET, FILM COATED ORAL at 13:13

## 2017-06-09 RX ADMIN — WATER SCH: 100 INJECTION, SOLUTION INTRAVENOUS at 22:53

## 2017-06-09 RX ADMIN — LAMOTRIGINE SCH MG: 25 TABLET ORAL at 18:56

## 2017-06-09 RX ADMIN — OXYCODONE HYDROCHLORIDE PRN MG: 5 CAPSULE ORAL at 13:12

## 2017-06-09 RX ADMIN — CLONAZEPAM PRN MG: 0.5 TABLET ORAL at 00:45

## 2017-06-09 RX ADMIN — IBUPROFEN PRN MG: 400 TABLET ORAL at 08:01

## 2017-06-09 RX ADMIN — ONDANSETRON PRN MG: 4 TABLET, ORALLY DISINTEGRATING ORAL at 22:53

## 2017-06-09 RX ADMIN — MENTHOL, METHYL SALICYLATE SCH: 10; 15 CREAM TOPICAL at 22:40

## 2017-06-09 NOTE — PN
Subjective





- Subjective


Service Type: [id number] Hosp care 25 min moderate complexity


Subjective: 





Noni is very unhappy that she cannot attend her 6 yr old daughter's girl 

 ceremony tonight.  She continues to refuse lithium or depakote, and today 

also turned down Trileptal, but agreed to begin a trial of Lamictal against the 

anticipated depression that always follows her manic episodes she says.  She 

also complained of staff not being responsive to her requests for PRN medication

, and suggested we should work on improving availability of PRN medications.





Objective





- Appearance


Appearance: Well Developed/Nourished


Dysmorphic Features: No


Hygiene: Normal


Grooming: Well Kept





- Behavior


Psychomotor Activities: Normal


Exhibits Abnormal Movement: No





- Attitude and Relatedness


Attitude and Relatedness: Irritable


Eye Contact: Good





- Speech


Quality: Pressured


Latencies: Short


Quantity: Copious





- Mood


Patient's Decription of Mood: "Sad"





- Affect


Observed Affect: Tearful


Affect Consistent with: Dysphoria





- Thought Process


Patient's Thought Process: Coherent, Goal Directed


Thought Content: No Passive Death Wish, No Suicidal Planning, No Homicidal 

Ideation, No Paranoid Ideation





- Sensorium


Experiencing Hallucinations: No, Sensorium is Clear


Type of Hallucinations: Visual: No, Auditory: No, Command: No





- Level of Consciousness


Level of Consciousness: Agitated - mildly


Orientation: Yes Intact, Yes Orientated to Time, Yes Orientated to Place, Yes 

Orientated to Person





- Impulse Control


Impulse Control: Tenuous





- Insight and Judgement


Insight and Judgement: Poor





- Group Participation


Particating in Group Activities: Yes


Group Participation Comments: but declining most groups





- Medication Management


Medication Management Adherence: Yes





Assessment





- Assessment


Merits Inpatient Hospitalization: For Stabilization, For Discharge Planning, 

Pending Safe DC Plan


Inpatient DSM-IV Dx: Bipolar Affective Disorder, MRE manic.  rule out cannabis-

induced mood disorder


Clinical Impression: 





5.29.17


Noni Siddiqui is a 37-year-old woman admitted due to disorganized thought and 

behavior demonstrated on a second presentation in the ED in the context of a 

history of bipolar disorder, raising a high likelihood of luis as the cause.  

She assesses herself as manic in fact.  Her story gets complicated in the 

details around he children and her .  Her  took her 3 children 

out of school without her knowledge, then demanded she get a MHE before being 

able to see the kids.  I was on-call for her first ED presentation, in which 

evaluators determined she was not at acute risk, so we discharged her.  She 

returned presenting as more disorganized, so was admitted out of safety 

concerns for inability to adequately care for herself to avoid harm.  She has 

not stated any dangerous intent or plan in any evaluation thus far.  There was 

a recent death of her brother-in-law playing a role in recent events.  She told 

me today that her  had been spending too much time caring for his nieces/

nephews, and she had told him not to come back if he went out again to care for 

them, or at least this appears to be the gist of her somewhat disorganized 

report today.  In any case, matters are likely to be clarified by gathering 

collateral from her , and perhaps a family meeting.  I was told by ED 

evaluators during the admission process that they had tried unsuccessfully to 

contact him.  She also reports ongoing gynecologic and GI concerns with follow 

ups arranged by her PCP at [facility].





6.7.17


Care resumed from Dr Lunsford 6.6.17.  Noni continues to show signs of luis, 

refuses lithium or Depakote.  Will address multiple medical issues outpatient 

except for single specific acute complaint of increased abdominal distension 

and pain: she agrees to try restarting antispasmodic and PPI against this per 

her GI NP, hold off on hospitalist consult unless pain/distension persists (she 

has been afebrile throughout).  Will need to confirm with  that her home 

is a safe place as regards residual luis for her  and her family before 

arranging discharge.  





6.9.17


Noni continues to present as labile, pressured and manic, refusing lithium or 

Depakote, stating she feels she has only started the Zyprexa and wants to give 

it time to work for her before trying another medication.  She did agree to a 

trial of Lamictal, for which we discussed side-effects, expected benefit 

against recurrent depression with lower likelihood of benefit against luis, 

and need for slow up-titration to avoid risk of serious rash, so unlikely to be 

of benefit acutely, but may help avert subsequent depressive bouts.  Dr Wyatt 

recommended magnesium citrate 150 mL to address complaint of constipation.  





Plan





- Plan


Treatment Plan: 


Name: NONI SIDDIQUI                        


YOB: 1980                        


[id number]


[id number]








Noni reports that addition of antispasmodic Bentyl and PPI omeprazole against 

GI pain unhelpful.  Encourage groups and milieu.  Offer supportive listening.  

Plan discharge per collateral information and hospital course, likely return of 

care at [facility] and f/u for gyn, GI issues.  Discharge depends on family 

preparedness for Noni's return home, and their sense of safety with any 

residual manic symptoms.


Continued Medication Management: Different Medication


Medications: 


 Current Medications





Acetaminophen (Tylenol Tab*)  650 mg PO Q4H PRN


   PRN Reason: PAIN or TEMP > 101 F


   Last Admin: 06/08/17 12:22 Dose:  650 mg


Al Hydrox/Mg Hydrox/Simethicone (Maalox Plus*)  30 ml PO Q4H PRN


   PRN Reason: INDIGESTION


   Last Admin: 06/06/17 11:39 Dose:  30 ml


Clonazepam (Klonopin Tab(*))  0.5 mg PO TID PRN


   PRN Reason: ANXIETY


   Last Admin: 06/09/17 00:45 Dose:  0.5 mg


Device (Nicotine Mouth Piece*)  1 each INH [facility]GE [facility]


   Last Admin: 06/08/17 07:56 Dose:  1 each


Dicyclomine HCl (Bentyl Cap*)  20 mg PO TID PRN


   PRN Reason: abdominal cramping


   Last Admin: 06/09/17 09:24 Dose:  20 mg


Docusate Sodium (Colace Cap*)  100 mg PO DAILY PRN


   PRN Reason: CONSTIPATION


   Last Admin: 06/09/17 07:00 Dose:  100 mg


Hydroxyzine HCl (Atarax Tab*)  25 mg PO QID PRN


   PRN Reason: ANXIETY


   Last Admin: 06/09/17 13:13 Dose:  25 mg


Ibuprofen (Motrin Tab*)  400 mg PO Q6H PRN


   PRN Reason: PAIN


   Last Admin: 06/09/17 08:01 Dose:  400 mg


Multi-Ingredient Liniment/Rub (Arsen Barron*)  1 applic [facility]ID [facility]


   Last Admin: 06/09/17 09:26 Dose:  1 applic


Multivitamins (Theragran Tab*)  1 tab PO DAILY [facility]


   Last Admin: 06/09/17 08:03 Dose:  Not Given


Nicotine (Nicotine Inhaler*)  10 mg INH Q2H PRN


   PRN Reason: CRAVING


   Last Admin: 06/09/17 07:00 Dose:  10 mg


Nicotine (Nicotine Patch 21 Mg/24 Hr*)  1 patch TRANSDERM Q24HR [facility]


   Last Admin: 06/09/17 08:01 Dose:  1 patch


Nicotine Polacrilex (Nicotine Gum*)  2 mg PO Q2H PRN


   PRN Reason: CRAVING


   Last Admin: 06/02/17 09:31 Dose:  2 mg


Olanzapine (Zyprexa Tab*)  5 mg PO DAILY PRN


   PRN Reason: AGITATION


   Last Admin: 06/08/17 13:08 Dose:  5 mg


Olanzapine (Zyprexa Tab*)  20 mg PO [facility]TIME [facility]


   Last Admin: 06/08/17 22:25 Dose:  20 mg


Omeprazole (Prilosec Cap*)  20 mg PO DAILY@0600 [facility]


   Last Admin: 06/09/17 05:55 Dose:  20 mg


Ondansetron HCl (Zofran Odt Tab*)  4 mg PO TID PRN


   PRN Reason: NAUSEA


   Last Admin: 06/09/17 09:31 Dose:  4 mg


Oxycodone HCl (Roxycodone Tab*)  5 mg PO Q6H PRN


   PRN Reason: abdominal pain


   Last Admin: 06/09/17 13:12 Dose:  5 mg


Pharmacy Profile Note (Nicotine Patch Removal Note*)  1 note PATCH OFF [facility]100 [facility]


   Last Admin: 06/08/17 21:18 Dose:  Not Given











- Discharge Plan


Discharge Plan: Outpatient Follow Up


Out[facility] Program: [facility]

## 2017-06-10 RX ADMIN — DOCUSATE SODIUM PRN MG: 100 CAPSULE, LIQUID FILLED ORAL at 21:17

## 2017-06-10 RX ADMIN — IBUPROFEN PRN MG: 400 TABLET ORAL at 11:15

## 2017-06-10 RX ADMIN — HYDROXYZINE HYDROCHLORIDE PRN MG: 25 TABLET, FILM COATED ORAL at 18:02

## 2017-06-10 RX ADMIN — OMEPRAZOLE SCH MG: 20 CAPSULE, DELAYED RELEASE ORAL at 07:39

## 2017-06-10 RX ADMIN — CLONAZEPAM PRN MG: 0.5 TABLET ORAL at 13:07

## 2017-06-10 RX ADMIN — OXYCODONE HYDROCHLORIDE PRN MG: 5 CAPSULE ORAL at 07:39

## 2017-06-10 RX ADMIN — MENTHOL, METHYL SALICYLATE SCH: 10; 15 CREAM TOPICAL at 21:40

## 2017-06-10 RX ADMIN — LAMOTRIGINE SCH MG: 25 TABLET ORAL at 07:44

## 2017-06-10 RX ADMIN — OXYCODONE HYDROCHLORIDE PRN MG: 5 CAPSULE ORAL at 01:02

## 2017-06-10 RX ADMIN — NICOTINE SCH PATCH: 21 PATCH TRANSDERMAL at 07:44

## 2017-06-10 RX ADMIN — OLANZAPINE SCH MG: 10 TABLET ORAL at 21:13

## 2017-06-10 RX ADMIN — CLONAZEPAM PRN MG: 0.5 TABLET ORAL at 21:17

## 2017-06-10 RX ADMIN — ACETAMINOPHEN PRN MG: 325 TABLET ORAL at 13:10

## 2017-06-10 RX ADMIN — DICYCLOMINE HYDROCHLORIDE PRN MG: 10 CAPSULE ORAL at 07:43

## 2017-06-10 RX ADMIN — OXYCODONE HYDROCHLORIDE PRN MG: 5 CAPSULE ORAL at 14:30

## 2017-06-10 RX ADMIN — OLANZAPINE PRN MG: 5 TABLET, FILM COATED ORAL at 14:29

## 2017-06-10 RX ADMIN — THERA TABS SCH: TAB at 08:15

## 2017-06-10 RX ADMIN — DICYCLOMINE HYDROCHLORIDE PRN MG: 10 CAPSULE ORAL at 23:45

## 2017-06-10 RX ADMIN — IBUPROFEN PRN MG: 400 TABLET ORAL at 22:55

## 2017-06-10 RX ADMIN — CLONAZEPAM PRN MG: 0.5 TABLET ORAL at 01:01

## 2017-06-10 RX ADMIN — MENTHOL, METHYL SALICYLATE SCH: 10; 15 CREAM TOPICAL at 07:40

## 2017-06-10 RX ADMIN — ACETAMINOPHEN PRN MG: 325 TABLET ORAL at 01:02

## 2017-06-10 RX ADMIN — WATER SCH NOTE: 100 INJECTION, SOLUTION INTRAVENOUS at 21:40

## 2017-06-10 RX ADMIN — DICYCLOMINE HYDROCHLORIDE PRN MG: 10 CAPSULE ORAL at 18:01

## 2017-06-10 RX ADMIN — OXYCODONE HYDROCHLORIDE PRN MG: 5 CAPSULE ORAL at 21:17

## 2017-06-10 RX ADMIN — LAMOTRIGINE SCH: 25 TABLET ORAL at 08:16

## 2017-06-10 RX ADMIN — HYDROXYZINE HYDROCHLORIDE PRN MG: 25 TABLET, FILM COATED ORAL at 11:15

## 2017-06-10 NOTE — RAD
HISTORY:  Abdominal distention rule out obstruction



COMPARISONS: CT dated May 05, 2017



VIEWS: Frontal views of the abdomen.



FINDINGS: 

BOWEL: There is a nonobstructive bowel gas pattern. There is a large amount of stool

within the colon.

CALCULI: There are no abnormal calculi.

BONES AND SOFT TISSUES: There are no osseous abnormalities.

OTHER FINDINGS: The lung bases are clear. There is no subphrenic gas.



IMPRESSION: 

NONOBSTRUCTIVE BOWEL GAS PATTERN. LARGE AMOUNT OF STOOL THROUGHOUT THE COLON.

## 2017-06-11 RX ADMIN — ACETAMINOPHEN PRN MG: 325 TABLET ORAL at 21:36

## 2017-06-11 RX ADMIN — DOCUSATE SODIUM PRN MG: 100 CAPSULE, LIQUID FILLED ORAL at 10:38

## 2017-06-11 RX ADMIN — CLONAZEPAM PRN MG: 0.5 TABLET ORAL at 20:15

## 2017-06-11 RX ADMIN — CLONAZEPAM PRN MG: 0.5 TABLET ORAL at 11:47

## 2017-06-11 RX ADMIN — OXYCODONE HYDROCHLORIDE PRN MG: 5 CAPSULE ORAL at 18:05

## 2017-06-11 RX ADMIN — MENTHOL, METHYL SALICYLATE SCH: 10; 15 CREAM TOPICAL at 20:50

## 2017-06-11 RX ADMIN — ONDANSETRON PRN MG: 4 TABLET, ORALLY DISINTEGRATING ORAL at 07:57

## 2017-06-11 RX ADMIN — OLANZAPINE PRN MG: 5 TABLET, FILM COATED ORAL at 14:43

## 2017-06-11 RX ADMIN — OXYCODONE HYDROCHLORIDE PRN MG: 5 CAPSULE ORAL at 05:30

## 2017-06-11 RX ADMIN — OMEPRAZOLE SCH MG: 20 CAPSULE, DELAYED RELEASE ORAL at 05:37

## 2017-06-11 RX ADMIN — CLONAZEPAM PRN MG: 0.5 TABLET ORAL at 18:05

## 2017-06-11 RX ADMIN — LAMOTRIGINE SCH MG: 25 TABLET ORAL at 07:57

## 2017-06-11 RX ADMIN — THERA TABS SCH: TAB at 08:17

## 2017-06-11 RX ADMIN — DICYCLOMINE HYDROCHLORIDE PRN MG: 10 CAPSULE ORAL at 14:47

## 2017-06-11 RX ADMIN — OLANZAPINE SCH MG: 10 TABLET ORAL at 20:13

## 2017-06-11 RX ADMIN — IBUPROFEN PRN MG: 400 TABLET ORAL at 07:56

## 2017-06-11 RX ADMIN — IBUPROFEN PRN MG: 400 TABLET ORAL at 14:43

## 2017-06-11 RX ADMIN — MENTHOL, METHYL SALICYLATE SCH: 10; 15 CREAM TOPICAL at 08:16

## 2017-06-11 RX ADMIN — NICOTINE PRN MG: 4 INHALANT RESPIRATORY (INHALATION) at 14:44

## 2017-06-11 RX ADMIN — NICOTINE PRN MG: 4 INHALANT RESPIRATORY (INHALATION) at 05:30

## 2017-06-11 RX ADMIN — DICYCLOMINE HYDROCHLORIDE PRN MG: 10 CAPSULE ORAL at 20:13

## 2017-06-11 RX ADMIN — ACETAMINOPHEN PRN MG: 325 TABLET ORAL at 10:38

## 2017-06-11 RX ADMIN — DICYCLOMINE HYDROCHLORIDE PRN MG: 10 CAPSULE ORAL at 05:30

## 2017-06-11 RX ADMIN — HYDROXYZINE HYDROCHLORIDE PRN MG: 25 TABLET, FILM COATED ORAL at 08:55

## 2017-06-11 RX ADMIN — LAMOTRIGINE SCH: 25 TABLET ORAL at 08:01

## 2017-06-11 RX ADMIN — NICOTINE SCH PATCH: 21 PATCH TRANSDERMAL at 07:58

## 2017-06-11 RX ADMIN — WATER SCH NOTE: 100 INJECTION, SOLUTION INTRAVENOUS at 20:17

## 2017-06-11 RX ADMIN — OXYCODONE HYDROCHLORIDE PRN MG: 5 CAPSULE ORAL at 11:43

## 2017-06-12 RX ADMIN — LAMOTRIGINE SCH: 25 TABLET ORAL at 08:33

## 2017-06-12 RX ADMIN — WATER SCH: 100 INJECTION, SOLUTION INTRAVENOUS at 20:42

## 2017-06-12 RX ADMIN — OLANZAPINE SCH MG: 10 TABLET ORAL at 21:51

## 2017-06-12 RX ADMIN — OMEPRAZOLE SCH: 20 CAPSULE, DELAYED RELEASE ORAL at 08:12

## 2017-06-12 RX ADMIN — ACETAMINOPHEN PRN MG: 325 TABLET ORAL at 11:07

## 2017-06-12 RX ADMIN — DICYCLOMINE HYDROCHLORIDE PRN MG: 10 CAPSULE ORAL at 08:10

## 2017-06-12 RX ADMIN — HYDROXYZINE HYDROCHLORIDE PRN MG: 25 TABLET, FILM COATED ORAL at 11:10

## 2017-06-12 RX ADMIN — NICOTINE SCH PATCH: 21 PATCH TRANSDERMAL at 08:10

## 2017-06-12 RX ADMIN — NICOTINE PRN MG: 4 INHALANT RESPIRATORY (INHALATION) at 15:52

## 2017-06-12 RX ADMIN — IBUPROFEN PRN MG: 400 TABLET ORAL at 06:25

## 2017-06-12 RX ADMIN — HYDROXYZINE HYDROCHLORIDE PRN MG: 25 TABLET, FILM COATED ORAL at 14:31

## 2017-06-12 RX ADMIN — OXYCODONE HYDROCHLORIDE PRN MG: 5 CAPSULE ORAL at 08:09

## 2017-06-12 RX ADMIN — OXYCODONE HYDROCHLORIDE PRN MG: 5 CAPSULE ORAL at 14:31

## 2017-06-12 RX ADMIN — IBUPROFEN PRN MG: 400 TABLET ORAL at 15:52

## 2017-06-12 RX ADMIN — ACETAMINOPHEN PRN MG: 325 TABLET ORAL at 18:50

## 2017-06-12 RX ADMIN — OXYCODONE HYDROCHLORIDE PRN MG: 5 CAPSULE ORAL at 20:43

## 2017-06-12 RX ADMIN — MENTHOL, METHYL SALICYLATE SCH: 10; 15 CREAM TOPICAL at 20:42

## 2017-06-12 RX ADMIN — DICYCLOMINE HYDROCHLORIDE PRN MG: 10 CAPSULE ORAL at 20:43

## 2017-06-12 RX ADMIN — THERA TABS SCH: TAB at 08:33

## 2017-06-12 RX ADMIN — IBUPROFEN PRN MG: 400 TABLET ORAL at 21:51

## 2017-06-12 RX ADMIN — MENTHOL, METHYL SALICYLATE SCH: 10; 15 CREAM TOPICAL at 08:33

## 2017-06-12 NOTE — PN
Subjective





- Subjective


Service Type: 81848 Hosp care 15 min low complexity


Subjective: 





The patient is met for the first time this morning, as I have taken over her 

care from the departing Dr. Torres.  Her father, Hubert, and SW Karine Aguirre are 

also present.  The patient appears hyperverbal and tearful at times, especially 

when discussing the wellbeing of her three children (aged 6-10).  Noni has 

been refusing her lamotrigine over the weekend and states her reasoning that 

she has already started one psychiatric medication this hospitalization, 

olanzapine, and does not want to complicate things by starting a second.  "How 

will I know which one is working if I'm started on two at the same time?"  She 

does not appear paranoid or unreasonable at any point, although very much 

hypomanic.  With much effort, Ms. Aguirre is able to convince her to include her 

, with whom she is quarreling, on her MAHIN for d/c planning purposes.  

She denies SI or HI.





Objective





- Appearance


Appearance: Well Developed/Nourished


Dysmorphic Features: No


Hygiene: Normal


Grooming: Well Kept





- Behavior


Psychomotor Activities: Normal


Exhibits Abnormal Movement: No





- Attitude and Relatedness


Attitude and Relatedness: Cooperative


Eye Contact: Fair





- Speech


Quality: Unpressured


Latencies: Normal


Quantity: Appropriate





- Mood


Patient's Decription of Mood: "Fine"





- Affect


Observed Affect: Tearful


Affect Consistent with: Euphoria





- Thought Process


Patient's Thought Process: Over Inclusive


Thought Content: No Passive Death Wish, No Suicidal Planning, No Homicidal 

Ideation, No Paranoid Ideation





- Sensorium


Experiencing Hallucinations: No, Sensorium is Clear


Type of Hallucinations: Visual: No, Auditory: No, Command: No





- Level of Consciousness


Level of Consciousness: Alert


Orientation: Yes Intact, Yes Orientated to Time, Yes Orientated to Place, Yes 

Orientated to Person





- Impulse Control


Impulse Control: Tenuous





- Insight and Judgement


Insight and Judgement: Fair





- Group Participation


Particating in Group Activities: No





- Medication Management


Medication Management Adherence: Yes





Assessment





- Assessment


Merits Inpatient Hospitalization: For Immediate Safety, For Stabilization


Inpatient DSM-IV Dx: Bipolar Affective Disorder, MRE manic.  rule out cannabis-

induced mood disorder


Clinical Impression: 





37 y.o. , white female with a history of bipolar and borderline 

personality disorders who presented to the hospital with acute manic psychosis 

and intramarital conflict with her .





Plan





- Plan


Treatment Plan: 


Name: NONI SIDDIQUI                        


YOB: 1980                        


[id number]


[id number]








The patient has been improving on olanzapine 20mg PO qhs, which she appears to 

be tolerating well.  She declines a hospitalist consult for constipation due to 

her preference to follow up shortly with outpatient PCP, a doctor Crepe at 

[facility].  Still symptomatic.  Await med effect.


Continued Medication Management: Start Medication


Medications: 


 Current Medications





Acetaminophen (Tylenol Tab*)  650 mg PO Q4H PRN


   PRN Reason: PAIN or TEMP > 101 F


   Last Admin: 06/12/17 11:07 Dose:  650 mg


Al Hydrox/Mg Hydrox/Simethicone (Maalox Plus*)  30 ml PO Q4H PRN


   PRN Reason: INDIGESTION


   Last Admin: 06/06/17 11:39 Dose:  30 ml


Clonazepam (Klonopin Tab(*))  0.5 mg PO TID PRN


   PRN Reason: ANXIETY


   Last Admin: 06/11/17 20:15 Dose:  0.5 mg


Device (Nicotine Mouth Piece*)  1 each INH .CARTRIDGE Pending sale to [facility]


   Last Admin: 06/08/17 07:56 Dose:  1 each


Dicyclomine HCl (Bentyl Cap*)  20 mg PO TID PRN


   PRN Reason: abdominal cramping


   Last Admin: 06/12/17 08:10 Dose:  20 mg


Docusate Sodium (Colace Cap*)  100 mg PO DAILY PRN


   PRN Reason: CONSTIPATION


   Last Admin: 06/11/17 10:38 Dose:  100 mg


Hydroxyzine HCl (Atarax Tab*)  25 mg PO QID PRN


   PRN Reason: ANXIETY


   Last Admin: 06/12/17 11:10 Dose:  25 mg


Ibuprofen (Motrin Tab*)  400 mg PO Q6H PRN


   PRN Reason: PAIN


   Last Admin: 06/12/17 06:25 Dose:  400 mg


Lamotrigine (Lamictal Tab(*))  25 mg PO DAILY Pending sale to [facility]


   Last Admin: 06/12/17 08:33 Dose:  Not Given


Multi-Ingredient Liniment/Rub (Arsen Barron*)  1 applic TOPICAL BID Pending sale to [facility]


   Last Admin: 06/12/17 08:33 Dose:  Not Given


Multivitamins (Theragran Tab*)  1 tab PO DAILY Pending sale to [facility]


   Last Admin: 06/12/17 08:33 Dose:  Not Given


Nicotine (Nicotine Inhaler*)  10 mg INH Q2H PRN


   PRN Reason: CRAVING


   Last Admin: 06/11/17 14:44 Dose:  10 mg


Nicotine (Nicotine Patch 21 Mg/24 Hr*)  1 patch TRANSDERM Q24HR [facility]


   Last Admin: 06/12/17 08:10 Dose:  1 patch


Nicotine Polacrilex (Nicotine Gum*)  2 mg PO Q2H PRN


   PRN Reason: CRAVING


   Last Admin: 06/02/17 09:31 Dose:  2 mg


Olanzapine (Zyprexa Tab*)  5 mg PO DAILY PRN


   PRN Reason: AGITATION


   Last Admin: 06/11/17 14:43 Dose:  5 mg


Olanzapine (Zyprexa Tab*)  20 mg PO BEDTIME Pending sale to [facility]


   Last Admin: 06/11/17 20:13 Dose:  20 mg


Omeprazole (Prilosec Cap*)  20 mg PO DAILY@0600 Pending sale to [facility]


   Last Admin: 06/12/17 08:12 Dose:  Not Given


Ondansetron HCl (Zofran Odt Tab*)  4 mg PO TID PRN


   PRN Reason: NAUSEA


   Last Admin: 06/11/17 07:57 Dose:  4 mg


Oxycodone HCl (Roxycodone Tab*)  5 mg PO Q6H PRN


   PRN Reason: abdominal pain


   Last Admin: 06/12/17 08:09 Dose:  5 mg


Pharmacy Profile Note (Nicotine Patch Removal Note*)  1 note PATCH OFF 2100 Pending sale to [facility]


   Last Admin: 06/11/17 20:17 Dose:  1 note











- Discharge Plan


Discharge Plan: Inpatient Hospitalization

## 2017-06-12 NOTE — PN
MHU: Group Therapy Note





- Service Type


Service Type: 80209 Group Psychotherapy - Cognitive Behavioral Group Therapy (

CBT):Patient was attentive and participatory in CBT programming this morning, 

and remained in good behavioral control.  Patient expressed positive insights 

regarding relevant treatment interventions and goals.

## 2017-06-13 RX ADMIN — THERA TABS SCH: TAB at 08:40

## 2017-06-13 RX ADMIN — DICYCLOMINE HYDROCHLORIDE PRN MG: 10 CAPSULE ORAL at 08:08

## 2017-06-13 RX ADMIN — IBUPROFEN PRN MG: 400 TABLET ORAL at 06:30

## 2017-06-13 RX ADMIN — NICOTINE PRN MG: 4 INHALANT RESPIRATORY (INHALATION) at 06:34

## 2017-06-13 RX ADMIN — OLANZAPINE PRN MG: 5 TABLET, FILM COATED ORAL at 19:25

## 2017-06-13 RX ADMIN — OXYCODONE HYDROCHLORIDE PRN MG: 5 CAPSULE ORAL at 06:30

## 2017-06-13 RX ADMIN — HYDROXYZINE HYDROCHLORIDE PRN MG: 25 TABLET, FILM COATED ORAL at 08:09

## 2017-06-13 RX ADMIN — DICYCLOMINE HYDROCHLORIDE PRN MG: 10 CAPSULE ORAL at 06:29

## 2017-06-13 RX ADMIN — WATER SCH: 100 INJECTION, SOLUTION INTRAVENOUS at 21:50

## 2017-06-13 RX ADMIN — MENTHOL, METHYL SALICYLATE SCH: 10; 15 CREAM TOPICAL at 23:00

## 2017-06-13 RX ADMIN — DOCUSATE SODIUM PRN MG: 100 CAPSULE, LIQUID FILLED ORAL at 06:30

## 2017-06-13 RX ADMIN — OLANZAPINE SCH MG: 10 TABLET ORAL at 21:50

## 2017-06-13 RX ADMIN — CLONAZEPAM PRN MG: 0.5 TABLET ORAL at 23:00

## 2017-06-13 RX ADMIN — DICYCLOMINE HYDROCHLORIDE PRN MG: 10 CAPSULE ORAL at 15:39

## 2017-06-13 RX ADMIN — OXYCODONE HYDROCHLORIDE PRN MG: 5 CAPSULE ORAL at 21:48

## 2017-06-13 RX ADMIN — LAMOTRIGINE SCH: 25 TABLET ORAL at 08:40

## 2017-06-13 RX ADMIN — HYDROXYZINE HYDROCHLORIDE PRN MG: 25 TABLET, FILM COATED ORAL at 23:50

## 2017-06-13 RX ADMIN — NICOTINE PRN MG: 4 INHALANT RESPIRATORY (INHALATION) at 14:24

## 2017-06-13 RX ADMIN — CLONAZEPAM PRN MG: 0.5 TABLET ORAL at 11:57

## 2017-06-13 RX ADMIN — ACETAMINOPHEN PRN MG: 325 TABLET ORAL at 11:17

## 2017-06-13 RX ADMIN — NICOTINE SCH PATCH: 21 PATCH TRANSDERMAL at 08:09

## 2017-06-13 RX ADMIN — OXYCODONE HYDROCHLORIDE PRN MG: 5 CAPSULE ORAL at 15:37

## 2017-06-13 RX ADMIN — HYDROXYZINE HYDROCHLORIDE PRN MG: 25 TABLET, FILM COATED ORAL at 14:24

## 2017-06-13 RX ADMIN — OMEPRAZOLE SCH MG: 20 CAPSULE, DELAYED RELEASE ORAL at 08:32

## 2017-06-13 RX ADMIN — MENTHOL, METHYL SALICYLATE SCH: 10; 15 CREAM TOPICAL at 08:40

## 2017-06-13 NOTE — PN
Subjective





- Subjective


Service Type: [id number] Hosp care 15 min low complexity


Subjective: 





The patient remains overtalkative but is focussed on reality-based topics and 

is very interested in d/c planning.  She continues to refuse lamotrigine due to 

concerns of starting more than one new medication at once.  She has bills to 

pay and arrangements to make related to prison issues with her children.  

She denies SI or HI.  Behavior on unit can still be disruptive at times.





Objective





- Appearance


Appearance: Well Developed/Nourished


Dysmorphic Features: No


Hygiene: Normal


Grooming: Well Kept





- Behavior


Psychomotor Activities: Normal


Exhibits Abnormal Movement: No





- Attitude and Relatedness


Attitude and Relatedness: Cooperative


Eye Contact: Good





- Speech


Quality: Pressured


Latencies: Short


Quantity: Copious





- Mood


Patient's Decription of Mood: "Fine"





- Affect


Observed Affect: Euphoric


Affect Consistent with: Euphoria





- Thought Process


Patient's Thought Process: Tangential


Thought Content: No Passive Death Wish, No Suicidal Planning, No Homicidal 

Ideation, No Paranoid Ideation





- Sensorium


Experiencing Hallucinations: No, Sensorium is Clear


Type of Hallucinations: Visual: No, Auditory: No, Command: No





- Level of Consciousness


Level of Consciousness: Alert


Orientation: Yes Intact, Yes Orientated to Time, Yes Orientated to Place, Yes 

Orientated to Person





- Impulse Control


Impulse Control: Tenuous





- Insight and Judgement


Insight and Judgement: Fair





- Group Participation


Particating in Group Activities: Yes





- Medication Management


Medication Management Adherence: Partial





Assessment





- Assessment


Merits Inpatient Hospitalization: For Immediate Safety, For Stabilization


Inpatient DSM-IV Dx: Bipolar Affective Disorder, MRE manic.  rule out cannabis-

induced mood disorder


Clinical Impression: 





37 y.o. , white female with a history of bipolar and borderline 

personality disorders who presented to the hospital with acute manic psychosis 

and intramarital conflict with her .





Plan





- Plan


Treatment Plan: 


Name: NONI SIDDIQUI                        


YOB: 1980                        


[id number]


[id number]








The patient has been improving on olanzapine 20mg PO qhs, which she appears to 

be tolerating well.  She declines a hospitalist consult for constipation due to 

her preference to follow up shortly with outpatient PCP, a doctor Crepe at 

[facility].  Still symptomatic.  Await med effect.


Continued Medication Management: Start Medication


Medications: 


 Current Medications





Acetaminophen (Tylenol Tab*)  650 mg PO Q4H PRN


   PRN Reason: PAIN or TEMP > 101 F


   Last Admin: 06/13/17 11:17 Dose:  650 mg


Al Hydrox/Mg Hydrox/Simethicone (Maalox Plus*)  30 ml PO Q4H PRN


   PRN Reason: INDIGESTION


   Last Admin: 06/06/17 11:39 Dose:  30 ml


Clonazepam (Klonopin Tab(*))  0.5 mg PO TID PRN


   PRN Reason: ANXIETY


   Last Admin: 06/13/17 11:57 Dose:  0.5 mg


Device (Nicotine Mouth Piece*)  1 each INH .CARTRIDGE [facility]


   Last Admin: 06/08/17 07:56 Dose:  1 each


Dicyclomine HCl (Bentyl Cap*)  20 mg PO TID PRN


   PRN Reason: abdominal cramping


   Last Admin: 06/13/17 15:39 Dose:  20 mg


Docusate Sodium (Colace Cap*)  100 mg PO DAILY PRN


   PRN Reason: CONSTIPATION


   Last Admin: 06/13/17 06:30 Dose:  100 mg


Hydroxyzine HCl (Atarax Tab*)  25 mg PO QID PRN


   PRN Reason: ANXIETY


   Last Admin: 06/13/17 14:24 Dose:  25 mg


Ibuprofen (Motrin Tab*)  400 mg PO Q6H PRN


   PRN Reason: PAIN


   Last Admin: 06/13/17 06:30 Dose:  400 mg


Magnesium Citrate (Citrate Of Magnesia*)  300 ml PO DAILY PRN


   PRN Reason: CONSTIPATION


Multi-Ingredient Liniment/Rub (Arsen Barron*)  1 applic TOPICAL BID [facility]


   Last Admin: 06/13/17 08:40 Dose:  Not Given


Multivitamins (Theragran Tab*)  1 tab PO DAILY [facility]


   Last Admin: 06/13/17 08:40 Dose:  Not Given


Nicotine (Nicotine Inhaler*)  10 mg INH Q2H PRN


   PRN Reason: CRAVING


   Last Admin: 06/13/17 14:24 Dose:  10 mg


Nicotine (Nicotine Patch 21 Mg/24 Hr*)  1 patch TRANSDERM Q24HR [facility]


   Last Admin: 06/13/17 08:09 Dose:  1 patch


Nicotine Polacrilex (Nicotine Gum*)  2 mg PO Q2H PRN


   PRN Reason: CRAVING


   Last Admin: 06/02/17 09:31 Dose:  2 mg


Olanzapine (Zyprexa Tab*)  5 mg PO DAILY PRN


   PRN Reason: AGITATION


   Last Admin: 06/11/17 14:43 Dose:  5 mg


Olanzapine (Zyprexa Tab*)  20 mg PO BEDTIME [facility]


   Last Admin: 06/12/17 21:51 Dose:  20 mg


Omeprazole (Prilosec Cap*)  20 mg PO DAILY@0600 [facility]


   Last Admin: 06/13/17 08:32 Dose:  20 mg


Ondansetron HCl (Zofran Odt Tab*)  4 mg PO TID PRN


   PRN Reason: NAUSEA


   Last Admin: 06/11/17 07:57 Dose:  4 mg


Oxycodone HCl (Roxycodone Tab*)  5 mg PO Q6H PRN


   PRN Reason: abdominal pain


   Last Admin: 06/13/17 15:37 Dose:  5 mg


Pharmacy Profile Note (Nicotine Patch Removal Note*)  1 note PATCH OFF 2100 [facility]


   Last Admin: 06/12/17 20:42 Dose:  Not Given











- Discharge Plan


Discharge Plan: Inpatient Hospitalization

## 2017-06-14 RX ADMIN — NICOTINE SCH PATCH: 21 PATCH TRANSDERMAL at 08:02

## 2017-06-14 RX ADMIN — DICYCLOMINE HYDROCHLORIDE PRN MG: 10 CAPSULE ORAL at 21:07

## 2017-06-14 RX ADMIN — IBUPROFEN PRN MG: 400 TABLET ORAL at 08:01

## 2017-06-14 RX ADMIN — OXYCODONE HYDROCHLORIDE PRN MG: 5 CAPSULE ORAL at 13:16

## 2017-06-14 RX ADMIN — OXYCODONE HYDROCHLORIDE PRN MG: 5 CAPSULE ORAL at 21:05

## 2017-06-14 RX ADMIN — HYDROXYZINE HYDROCHLORIDE PRN MG: 25 TABLET, FILM COATED ORAL at 08:59

## 2017-06-14 RX ADMIN — DICYCLOMINE HYDROCHLORIDE PRN MG: 10 CAPSULE ORAL at 13:16

## 2017-06-14 RX ADMIN — NICOTINE PRN MG: 4 INHALANT RESPIRATORY (INHALATION) at 13:16

## 2017-06-14 RX ADMIN — MENTHOL, METHYL SALICYLATE SCH: 10; 15 CREAM TOPICAL at 21:04

## 2017-06-14 RX ADMIN — THERA TABS SCH TAB: TAB at 08:01

## 2017-06-14 RX ADMIN — WATER SCH NOTE: 100 INJECTION, SOLUTION INTRAVENOUS at 21:08

## 2017-06-14 RX ADMIN — OXYCODONE HYDROCHLORIDE PRN MG: 5 CAPSULE ORAL at 06:20

## 2017-06-14 RX ADMIN — OLANZAPINE SCH MG: 10 TABLET ORAL at 21:06

## 2017-06-14 RX ADMIN — CLONAZEPAM PRN MG: 0.5 TABLET ORAL at 09:59

## 2017-06-14 RX ADMIN — ACETAMINOPHEN PRN MG: 325 TABLET ORAL at 13:17

## 2017-06-14 RX ADMIN — DOCUSATE SODIUM PRN MG: 100 CAPSULE, LIQUID FILLED ORAL at 08:02

## 2017-06-14 RX ADMIN — CLONAZEPAM PRN MG: 0.5 TABLET ORAL at 23:54

## 2017-06-14 RX ADMIN — OMEPRAZOLE SCH MG: 20 CAPSULE, DELAYED RELEASE ORAL at 06:00

## 2017-06-14 RX ADMIN — DICYCLOMINE HYDROCHLORIDE PRN MG: 10 CAPSULE ORAL at 06:20

## 2017-06-14 RX ADMIN — MENTHOL, METHYL SALICYLATE SCH: 10; 15 CREAM TOPICAL at 10:08

## 2017-06-14 RX ADMIN — DIVALPROEX SODIUM SCH MG: 500 TABLET, DELAYED RELEASE ORAL at 13:42

## 2017-06-14 NOTE — PN
Subjective





- Subjective


Service Type: [id number] Hosp care 15 min low complexity


Subjective: 





The patient remains somewhat hyperverbal but is appropriately focussed on 

pertinent subjects of a mundane, but important nature, such as paying bills and 

trying to figure out how she's going to regain custody of her children after she

's discharged.  After discussing treatment options and granting some 

psychoeducation on the subject of mood stabilizers, she agrees to a trial of 

Depakote.  The patient expresses her intent to comply fully with outpatient 

treatment at [facility].  She denies SI and HI.





Objective





- Appearance


Appearance: Well Developed/Nourished


Dysmorphic Features: No


Hygiene: Normal


Grooming: Well Kept





- Behavior


Psychomotor Activities: Normal


Exhibits Abnormal Movement: No





- Attitude and Relatedness


Attitude and Relatedness: Cooperative


Eye Contact: Good





- Speech


Quality: Unpressured


Latencies: Short


Quantity: Copious





- Mood


Patient's Decription of Mood: "Good"





- Affect


Observed Affect: Expansive


Affect Consistent with: Euphoria





- Thought Process


Patient's Thought Process: Tangential


Thought Content: No Passive Death Wish, No Suicidal Planning, No Homicidal 

Ideation, No Paranoid Ideation





- Sensorium


Experiencing Hallucinations: No, Sensorium is Clear


Type of Hallucinations: Visual: No, Auditory: No, Command: No





- Level of Consciousness


Orientation: Yes Intact, Yes Orientated to Time, Yes Orientated to Place, Yes 

Orientated to Person





- Impulse Control


Impulse Control: Poor





- Insight and Judgement


Insight and Judgement: Impaired





- Group Participation


Particating in Group Activities: No





- Medication Management


Medication Management Adherence: Yes





Assessment





- Assessment


Merits Inpatient Hospitalization: Consolidate Improvements, Pending Safe DC Plan


Inpatient DSM-IV Dx: Bipolar Affective Disorder, MRE manic.  rule out cannabis-

induced mood disorder


Clinical Impression: 





37 y.o. , white female with a history of bipolar and borderline 

personality disorders who presented to the hospital with acute manic psychosis 

and intramarital conflict with her .





Plan





- Plan


Treatment Plan: 


Name: NONI SIDDIQUI                        


YOB: 1980                        


[id number]


[id number]








The patient has been improving on olanzapine 20mg PO qhs, which she appears to 

be tolerating well.  Will add Depakote 500mg PO qday for mood stabilization.  

Consider d/c tomorrow (6/15) if still improving.


Continued Medication Management: Start Medication


Medications: 


 Current Medications





Acetaminophen (Tylenol Tab*)  650 mg PO Q4H PRN


   PRN Reason: PAIN or TEMP > 101 F


   Last Admin: 06/13/17 11:17 Dose:  650 mg


Al Hydrox/Mg Hydrox/Simethicone (Maalox Plus*)  30 ml PO Q4H PRN


   PRN Reason: INDIGESTION


   Last Admin: 06/06/17 11:39 Dose:  30 ml


Clonazepam (Klonopin Tab(*))  0.5 mg PO TID PRN


   PRN Reason: ANXIETY


   Last Admin: 06/14/17 09:59 Dose:  0.5 mg


Device (Nicotine Mouth Piece*)  1 each INH .CARTRIDGE [facility]


   Last Admin: 06/08/17 07:56 Dose:  1 each


Dicyclomine HCl (Bentyl Cap*)  20 mg PO TID PRN


   PRN Reason: abdominal cramping


   Last Admin: 06/14/17 06:20 Dose:  20 mg


Divalproex Sodium (Depakote Dr Tab(*))  500 mg PO DAILY [facility]


Docusate Sodium (Colace Cap*)  100 mg PO DAILY PRN


   PRN Reason: CONSTIPATION


   Last Admin: 06/14/17 08:02 Dose:  100 mg


Hydroxyzine HCl (Atarax Tab*)  25 mg PO QID PRN


   PRN Reason: ANXIETY


   Last Admin: 06/14/17 08:59 Dose:  25 mg


Ibuprofen (Motrin Tab*)  400 mg PO Q6H PRN


   PRN Reason: PAIN


   Last Admin: 06/14/17 08:01 Dose:  400 mg


Magnesium Citrate (Citrate Of Magnesia*)  300 ml PO DAILY PRN


   PRN Reason: CONSTIPATION


Multi-Ingredient Liniment/Rub (Arsen Barron*)  1 applic TOPICAL BID [facility]


   Last Admin: 06/14/17 10:08 Dose:  Not Given


Multivitamins (Theragran Tab*)  1 tab PO DAILY [facility]


   Last Admin: 06/14/17 08:01 Dose:  1 tab


Nicotine (Nicotine Inhaler*)  10 mg INH Q2H PRN


   PRN Reason: CRAVING


   Last Admin: 06/13/17 14:24 Dose:  10 mg


Nicotine (Nicotine Patch 21 Mg/24 Hr*)  1 patch TRANSDERM Q24HR [facility]


   Last Admin: 06/14/17 08:02 Dose:  1 patch


Nicotine Polacrilex (Nicotine Gum*)  2 mg PO Q2H PRN


   PRN Reason: CRAVING


   Last Admin: 06/02/17 09:31 Dose:  2 mg


Olanzapine (Zyprexa Tab*)  5 mg PO DAILY PRN


   PRN Reason: AGITATION


   Last Admin: 06/13/17 19:25 Dose:  5 mg


Olanzapine (Zyprexa Tab*)  20 mg PO BEDTIME [facility]


   Last Admin: 06/13/17 21:50 Dose:  20 mg


Omeprazole (Prilosec Cap*)  20 mg PO DAILY@0600 [facility]


   Last Admin: 06/14/17 06:00 Dose:  20 mg


Ondansetron HCl (Zofran Odt Tab*)  4 mg PO TID PRN


   PRN Reason: NAUSEA


   Last Admin: 06/11/17 07:57 Dose:  4 mg


Oxycodone HCl (Roxycodone Tab*)  5 mg PO Q6H PRN


   PRN Reason: abdominal pain


   Last Admin: 06/14/17 06:20 Dose:  5 mg


Pharmacy Profile Note (Nicotine Patch Removal Note*)  1 note PATCH OFF 2100 [facility]


   Last Admin: 06/13/17 21:50 Dose:  Not Given











- Discharge Plan


Discharge Plan: Outpatient Follow Up


Outpatient Program: [facility]

## 2017-06-14 NOTE — PN
MHU: Group Therapy Note





- Service Type


Service Type: 79960 Group Psychotherapy - Cognitive Behavioral Group Therapy (

CBT):Patient was attentive and participatory in CBT programming this morning, 

and remained in good behavioral control.  Patient expressed positive insights 

regarding relevant treatment interventions and goals.

## 2017-06-15 VITALS — SYSTOLIC BLOOD PRESSURE: 139 MMHG | DIASTOLIC BLOOD PRESSURE: 99 MMHG

## 2017-06-15 RX ADMIN — HYDROXYZINE HYDROCHLORIDE PRN MG: 25 TABLET, FILM COATED ORAL at 09:27

## 2017-06-15 RX ADMIN — ACETAMINOPHEN PRN MG: 325 TABLET ORAL at 05:52

## 2017-06-15 RX ADMIN — DIVALPROEX SODIUM SCH MG: 500 TABLET, DELAYED RELEASE ORAL at 09:25

## 2017-06-15 RX ADMIN — IBUPROFEN PRN MG: 400 TABLET ORAL at 09:31

## 2017-06-15 RX ADMIN — THERA TABS SCH: TAB at 09:24

## 2017-06-15 RX ADMIN — NICOTINE SCH PATCH: 21 PATCH TRANSDERMAL at 09:23

## 2017-06-15 RX ADMIN — MENTHOL, METHYL SALICYLATE SCH APPLIC: 10; 15 CREAM TOPICAL at 09:23

## 2017-06-15 RX ADMIN — DICYCLOMINE HYDROCHLORIDE PRN MG: 10 CAPSULE ORAL at 09:32

## 2017-06-15 RX ADMIN — OMEPRAZOLE SCH MG: 20 CAPSULE, DELAYED RELEASE ORAL at 05:49

## 2017-06-15 RX ADMIN — OXYCODONE HYDROCHLORIDE PRN MG: 5 CAPSULE ORAL at 05:52

## 2017-06-15 NOTE — DS
DISCHARGE SUMMARY:

 

DATE OF ADMISSION:  17

 

DATE OF DISCHARGE:  06/15/17

 

DISCHARGE DIAGNOSES:

Axis I:  Bipolar disorder, most recent episode manic, severe without psychotic 
features. 

Axis II:  Borderline personality disorder. 

Axis III:  Endometriosis, left-sided ovarian cyst, irritable bowel syndrome. 

Axis IV:  Severe primary support stressors. 

Axis V:  At the time of admission was 30 and at the time of discharge is 60.

 

CONDITION AT THE TIME OF DISCHARGE:  Stable.  The patient is calm and 
cooperative. She is expressive and future oriented.  Specifically, she is 
making plans to move out of the house she shares with her  and get her 
own place.  She is also talking appropriately about fighting her  for 
custody of their 3 children. She is tolerating her medications quite well and 
she has been denying suicidal or homicidal ideation throughout this 
hospitalization being safe on all checks.  At this time, she is requesting 
followup treatment in a less restrictive setting.  We have spoken with her 
father, who is in support of the discharge plan.

 

MENTAL STATUS EXAM:  The patient is a middle aged white female, who is clean, 
well- groomed.  Speech has a normal rate, tone and volume.  Mood is euthymic 
with a full affect.  Thought process is linear and goal directed.  Thought 
content is significant for her desire to leave the hospital.  She denies 
suicidal or homicidal ideations.  She denies auditory or visual hallucinations.
  Insight and judgement are fair given his willingness to follow up with 
outpatient treatment. Cognitively, she is awake and alert with what would 
appear to be an average intellect.

 

DISCHARGE INSTRUCTIONS:  To the patient are as follows:

 

A.  Medications:

1.  She is currently taking dicyclomine 20 mg p.o. t.i.d. as a p.r.n. for 
stomach cramps.

2.  Depakote 500 mg p.o. daily.

3.  Docusate 100 mg p.o. daily.

4.  Magnesium citrate 300 mL p.o. daily as a p.r.n. for constipation.

5.  Olanzapine 20 mg p.o. q. bedtime.

6.  Omeprazole 20 mg p.o. daily.

7.  Zofran 4 mg p.o. t.i.d. as a p.r.n. for nausea.

8.  Klonopin 0.5 mg p.o. t.i.d. as a p.r.n. for anxiety.

9.  Oxycodone 5 mg p.o. q.6h. as a p.r.n. for pain.

 

B.  Diet is regular.

 

C.  Activities:  As tolerated.  The patient is strongly encouraged to abstain 
from tobacco products.  However, she is declining the offer of continued 
nicotine replacement therapy indicating her current preference to continue 
smoking cigarettes. There are no diagnostic studies pending at the time of 
discharge.

 

D.  Followup care:  The patient will follow up at the [facility] within 1 week of discharge where her psychotherapist is named 
Maximus and her psychiatric provider is nurse practitioner, Barb Hale.  
Medical followup will be at the [facility] here in [place] with her family 
care provider, Dr. Villanueva.

 

HOSPITAL COURSE:  Part A:  Reason for admission:  The patient is a 37-year-old 
 white female with a history of both bipolar disorder and borderline 
personality disorder who arrived at our clinic via ambulance after an episode 
in which she became upset at her  and was displaying clear signs of 
bipolar luis.  Apparently prior to admission, her  with whom she has 
not been getting along apparently took her children out of the school and she 
had been searching for them.  The patient informed us in the emergency room 
that she had driven all over until she ran out of gas.  She stated that when 
she finally did talk to her  he informed her that he was keeping the 
children away from her until she went for a mental health evaluation.  She did 
come for that evaluation at the [facility] as a way of placating him 
and had been discharged early in the morning.  She states that she went to call 
the police to complain about her missing children, but her phone .  She 
also indicated that she was aware that a child protective services 
investigation had been ordered by the school after her  pulled them out 
of class for no reason.  She ended up coming back to the hospital in a state of 
agitation telling us that her  has been going crazy ever since the death 
of his brother several weeks ago.  She made bizarre statements to the effect 
that her  had kept a piece of his brother's skin inside a post- it note 
and was preserving this in their freezer.  We were able to reach her family who 
indicated that she had been manic for several weeks and they did not feel that 
her children were safe in her presence.

 

Part B:  Psychiatric treatment rendered:  The patient was admitted to the adult 
behavioral health unit where she was placed on q. 30 minute checks for her own 
safety.  All of her medications were resumed, but she was additionally placed 
on an antipsychotic medication olanzapine, which was titrated to 20 mg p.o. 
nightly and she tolerated that well.  Despite being on a therapeutic dose of an 
antipsychotic, she continued to demonstrate core manic features such as 
distractibility, grandiosity, flight of ideas, indiscreet hostile behavior and 
loud hyperverbal speech.  She was initially worked with by Dr. Yefri Torres, 
but then transferred to the service of Dr. King Lunsford.  Both of those 
clinicians tried to convince her to consider mood stabilizer therapy; however, 
she refused.  On the , she was transferred to the care of Dr. Julio Arnold.  At that time, she continued to refuse mood stabilizer 
medications giving the rationale that she had already started Zyprexa and she 
did not want to start 2 medications at the same time.  The patient received 
psychoeducation about the rationale behind mood stabilizer therapy and 
eventually she did agree to a trial of Depakote 500 mg p.o. daily which she 
tolerated well.  Her affect started improving and she became less hostile, less 
verbally aggressive and we were able to convert her from involuntary to 
voluntary status as the patient was willing to sign in and recognize that she 
needed mental health care.  Her father Hubert came in for family meeting and he 
was in support of the treatment plan.  At this time, she appears to be much 
more euthymic, much more in control.  She is future oriented and thinking about 
appropriate subjects such as how she is going to care for her children now that 
her marriage is breaking up. She has not demonstrated any physical violence or 
any attempts to harm herself throughout her hospitalization and we feel that 
she is appropriate for discharge to less restrictive setting at this time.

 

 [id number]/CPS #: [id number]

[facility] Center[facility]UNIOR

## 2018-08-01 ENCOUNTER — HOSPITAL ENCOUNTER (EMERGENCY)
Dept: HOSPITAL 25 - ED | Age: 38
Discharge: HOME | End: 2018-08-01
Payer: MEDICARE

## 2018-08-01 VITALS — SYSTOLIC BLOOD PRESSURE: 129 MMHG | DIASTOLIC BLOOD PRESSURE: 88 MMHG

## 2018-08-01 DIAGNOSIS — K08.89: Primary | ICD-10-CM

## 2018-08-01 DIAGNOSIS — F17.210: ICD-10-CM

## 2018-08-01 PROCEDURE — 99282 EMERGENCY DEPT VISIT SF MDM: CPT

## 2018-08-01 PROCEDURE — 96372 THER/PROPH/DIAG INJ SC/IM: CPT

## 2018-08-01 NOTE — ED
Headache





- History Of Current Complaint


Chief Complaint: EDDentalPain


Stated Complaint: DENTAL PAIN


Time Seen by Provider: 08/01/18 12:00


Hx Obtained From: Patient


Hx Last Menstrual Period: 4/9/13


Onset/Duration: Gradual Onset, Started days ago, Still Present, Worse Since - 

novocaine wore off


Initially Headache Was: Moderate


Currently Pain Is: Moderate


Timing: Constant, Days


Location of Headache: Other: - left lower 1st molar


Aggravating Factor: Nothing


Allevating Factors: Medication - percocet, ibuprofen, clove oil, peppermint oil

, salt rinses


Related History: Recent Trauma: - 7/24 filling at [facility]





- Allergies/Home Medications


Allergies/Adverse Reactions: 


 Allergies











Allergy/AdvReac Type Severity Reaction Status Date / Time


 


No Known Allergies Allergy   Verified 05/29/17 12:06














PMH/Surg Hx/FS Hx/Imm Hx


Endocrine/Hematology History: 


   Denies: Hx Diabetes, Hx Systemic Lupus Erythematosus, Hx Thyroid Disease


Cardiovascular History: 


   Denies: Hx Hypertension


Respiratory History: 


   Denies: Hx Asthma, Hx Chronic Obstructive Pulmonary Disease (COPD)


GI History: Reports: Hx Cirrhosis - says may have non ETOH related?, Hx 

Diverticulosis - says may have?


   Denies: Hx Ulcer


 History: 


   Denies: Hx Renal Disease


Musculoskeletal History: 


   Denies: Hx Rheumatoid Arthritis


Sensory History: Reports: Hx Contacts or Glasses


   Denies: Hx Hearing Aid


Opthamlomology History: Reports: Hx Contacts or Glasses


Psychiatric History: Reports: Hx Anxiety, Hx Eating Disorder, Hx Depression, Hx 

Panic Disorder, Hx Inpatient Treatment, Hx Community Mental Health Tx, Hx 

Bipolar Disorder, Hx Substance Abuse, Other Psychiatric Issues/Disorders


   Denies: Hx Attention Deficit Hyperactivity Disorder, Hx Post Traumatic 

Stress Disorder, Hx Schizophrenia, Hx Suicide Attempt, Hx of Violent Episodes 

Against Others





- Cancer History


Cancer Type, Location and Year: patient gives pressured, bizarre account of 

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 

result of mets.


Hx Chemotherapy: No


Hx Radiation Therapy: No


Hx Palliative Cancer Treatment: No





- Surgical History


Surgery Procedure, Year, and Place: 2001 - RIGHT Lymph nodes removed to biopsy 

thought she had cancer - she didn't.  2011- C section


Infectious Disease History: No


Infectious Disease History: 


   Denies: Hx Clostridium Difficile, Hx Hepatitis, Hx Human Immunodeficiency 

Virus (HIV), Hx of Known/Suspected MRSA, Hx Shingles, Hx Tuberculosis, Hx Known/

Suspected VRE, Hx Known/Suspected VRSA, History Other Infectious Disease, 

Traveled Outside the [place] in Last 30 Days





- Family History


Known Family History: Positive: Diabetes - Father , Other - CVA mother





- Social History


Alcohol Use: Rare


Alcohol Amount: 1-2 drinks, 3-4 times per year


Hx Substance Use: Yes


Substance Use Type: Reports: Marijuana


Hx Tobacco Use: Yes


Smoking Status (MU): Heavy Every Day Tobacco Smoker


Type: Cigarettes


Have You Smoked in the Last Year: Yes





Review of Systems


Negative: Fever


Positive: Dental Pain


Positive: no symptoms reported


All Other Systems Reviewed And Are Negative: Yes





Physical Exam





- Summary


Physical Exam Summary: 





Appearance: Well appearing, no pain distress


Skin: warm, dry, reflects adequate perfusion


Head/face: dental extracts of multiple teeth, including left lower 2nd and 3rd 

molars. No surrounding gingival erythema or abscess.


Eyes: EOMI, DELL


ENT: normal


Neck: supple, non-tender


Respiratory: CTA, breath sounds present


Cardiovascular: RRR, pulses symmetrical 


Abdomen: non-tender, soft


Bowel Sounds: present


Musculoskeletal: normal, strength/ROM intact


Neuro: normal, sensory motor intact, A&Ox3


Triage Information Reviewed: Yes


Vital Signs On Initial Exam: 


 Initial Vitals











Temp Pulse Resp BP Pulse Ox


 


 98.8 F   101   20   129/96   99 


 


 08/01/18 11:47  08/01/18 11:47  08/01/18 11:47  08/01/18 11:47  08/01/18 11:47











Vital Signs Reviewed: Yes





Procedures





- Procedure Summary


Procedure Summary: 





Dental block:


Reason: Left-sided first molar pain


Description: I performed an inferior alveolar block in the left jaw as well as 

supraperiosteal injection at the first lower molar on the left side.  This is 

accomplished with a total of 5 cc of 0.5% bupivacaine.  She tolerated this well 

with improvement of discomfort.  She spirits no complication.





Diagnostics





- Vital Signs


 Vital Signs











  Temp Pulse Resp BP Pulse Ox


 


 08/01/18 11:47  98.8 F  101  20  129/96  99














- Laboratory


Lab Statement: Any lab studies that have been ordered have been reviewed, and 

results considered in the medical decision making process.





Re-Evaluation





- Re-Evaluation


  ** First Eval


Re-Evaluation Time: 12:30


Change: Improved


Comment: pain for 9/10 to 4/10.





Headache Course/Dx





- Course


Course Of Treatment: Patient with dental pain after procedure.  She was given 

blocks in the area with some improvement of her discomfort.  She is already on 

NSAID, Percocet.  She was started on clindamycin and will follow-up with her 

dentist.





- Diagnoses


Provider Diagnoses: 


 Pain, dental








Discharge





- Sign-Out/Discharge


Documenting (check all that apply): Patient Departure - discharge





- Discharge Plan


Condition: Good


Disposition: HOME


Prescriptions: 


Clindamycin Cap(NF) [Clindamycin Cap 300 mg Cap(NF)] 300 mg PO TID #21 cap


Patient Education Materials:  Toothache (ED)


Referrals: 


Gabrielle Villanueva MD [Primary Care Provider] - 


Additional Instructions: 


Call [facility] today for an appointment.  Continue ibuprofen and prescribed 

pain medication.  Return if worse, fever, new symptoms or other concerns.





- Billing Disposition and Condition


Condition: GOOD


Disposition: Home

## 2018-08-05 ENCOUNTER — HOSPITAL ENCOUNTER (EMERGENCY)
Dept: HOSPITAL 25 - ED | Age: 38
Discharge: HOME | End: 2018-08-05
Payer: MEDICARE

## 2018-08-05 VITALS — SYSTOLIC BLOOD PRESSURE: 145 MMHG | DIASTOLIC BLOOD PRESSURE: 87 MMHG

## 2018-08-05 DIAGNOSIS — F17.210: ICD-10-CM

## 2018-08-05 DIAGNOSIS — K02.9: Primary | ICD-10-CM

## 2018-08-05 PROCEDURE — 99282 EMERGENCY DEPT VISIT SF MDM: CPT

## 2018-08-05 NOTE — ED
Progress





- Progress Note


Progress Note: 





I supervised the care of the physician assistant and I performed a history and 

physical on this patient.  I also performed the procedure on this patient.





History: Patient with persistent left lower first molar discomfort.  I saw her 

in the ER for similar one day prior and she had success from a dental block.  

She is maintained on clindamycin.  She recently had dental procedure by [facility].





Physical exam: Patient is uncomfortable appearing but her gingiva is 

noninflamed.  There is no evidence of trauma to the tooth.  There is filling.





Plan: I performed dental block again on her with relief of pain.  She is to 

follow-up with dental in the morning.  She is still on antibiotics.








Procedure----





Dental block done for pain:


Description: The patient was verbally consented.  An inferior [place] dental 

block is performed as well as a jessica-ostial block performed at the affected 

tooth with a total of 2 cc of 0.5% bupivacaine mixed with 1 cc of 2% lidocaine 

with epinephrine.  The patient had near immediate relief of discomfort.  She 

tolerated this well without complication.





Course/Dx





- Diagnoses


Provider Diagnoses: 


 Pain, dental








Discharge





- Sign-Out/Discharge


Documenting (check all that apply): Patient Departure





- Discharge Plan


Condition: Stable


Disposition: HOME


Referrals: 


Gabrielle Villanueva MD [Primary Care Provider] - 


Additional Instructions: 


I advise increasing your pain medication to 2 tabs 3 times daily as opposed to 

1 tab 3 times daily, you may also take 1 tab up to every 4 hours as an 

alternative.


Do not take Tylenol while taking this medication [place]


Ibuprofen 600 mg 3 times daily


Continue with Anbesol and cloves


It is very important to follow-up with your dentist ASAP





- Billing Disposition and Condition


Condition: STABLE


Disposition: Home

## 2018-08-06 NOTE — ED
Throat Pain/Nasal Congestion





- HPI Summary


HPI Summary: 


Patient is a 38-year-old female presenting to the ED with complaint of left 

sided lower dental pain at tooth #20.  She was seen here a few days ago and a 

dental block was performed with good effect.  She endorses getting the same 

tooth filled 2 weeks ago and has been experiencing worsening pain since that 

time.  She has been on clindamycin and remains on this medication at this time.

  She has taken ibuprofen, Tylenol, Thad, Anbesol without relief.  She has 

taken at home hydrocodone which was prescribed for her endometriosis also 

without relief.  She is tearful on exam.  Vital signs are stable.  She denies 

any recent fevers, sweats, chills.








- History of Current Complaint


Chief Complaint: EDDentalPain


Time Seen by Provider: 08/05/18 14:13


Hx Obtained From: Patient


Onset/Duration: Gradual Onset


Severity: Severe


Associated Signs And Symptoms: Positive: Negative





- Epiglottits Risk Factors


Epiglottis Risk Factors: Negative





- Allergies/Home Medications


Allergies/Adverse Reactions: 


 Allergies











Allergy/AdvReac Type Severity Reaction Status Date / Time


 


No Known Allergies Allergy   Verified 08/05/18 14:18











Home Medications: 


 Home Medications





BuPROPion XL* [Bupropion XL*] 300 mg PO DAILY 08/05/18 [History Confirmed 08/05/ 18]


Divalproex DR TAB(*) [Depakote DR TAB(*)] 1,000 mg PO DAILY 08/05/18 [History 

Confirmed 08/05/18]


clonazePAM TAB(*) [KlonoPIN TAB(*)] 1 mg PO TID PRN 08/05/18 [History Confirmed 

08/05/18]











PMH/Surg Hx/FS Hx/Imm Hx


Previously Healthy: Yes


Endocrine/Hematology History: 


   Denies: Hx Diabetes, Hx Systemic Lupus Erythematosus, Hx Thyroid Disease


Cardiovascular History: 


   Denies: Hx Hypertension


Respiratory History: 


   Denies: Hx Asthma, Hx Chronic Obstructive Pulmonary Disease (COPD)


GI History: Reports: Hx Cirrhosis - says may have non ETOH related?, Hx 

Diverticulosis - says may have?


   Denies: Hx Ulcer


 History: 


   Denies: Hx Renal Disease


Musculoskeletal History: 


   Denies: Hx Rheumatoid Arthritis


Sensory History: Reports: Hx Contacts or Glasses


   Denies: Hx Hearing Aid


Opthamlomology History: Reports: Hx Contacts or Glasses


Psychiatric History: Reports: Hx Anxiety, Hx Eating Disorder, Hx Depression, Hx 

Panic Disorder, Hx Inpatient Treatment, Hx Community Mental Health Tx, Hx 

Bipolar Disorder, Hx Substance Abuse, Other Psychiatric Issues/Disorders


   Denies: Hx Attention Deficit Hyperactivity Disorder, Hx Post Traumatic 

Stress Disorder, Hx Schizophrenia, Hx Suicide Attempt, Hx of Violent Episodes 

Against Others





- Cancer History


Cancer Type, Location and Year: patient gives pressured, bizarre account of 

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 

result of mets.


Hx Chemotherapy: No


Hx Radiation Therapy: No


Hx Palliative Cancer Treatment: No





- Surgical History


Surgery Procedure, Year, and Place: 2001 - RIGHT Lymph nodes removed to biopsy 

thought she had cancer - she didn't.  2011- C section





- Immunization History


Hx Pertussis Vaccination: No


Immunizations Up to Date: Unable to Obtain/Confirm


Infectious Disease History: No


Infectious Disease History: 


   Denies: Hx Clostridium Difficile, Hx Hepatitis, Hx Human Immunodeficiency 

Virus (HIV), Hx of Known/Suspected MRSA, Hx Shingles, Hx Tuberculosis, Hx Known/

Suspected VRE, Hx Known/Suspected VRSA, History Other Infectious Disease, 

Traveled Outside the [place] in Last 30 Days





- Family History


Known Family History: Positive: Diabetes - Father , Other - CVA mother





- Social History


Occupation: Employed Full-time


Lives: With Family


Alcohol Use: Occasionally


Alcohol Amount: 1-2 drinks, 3-4 times per year


Hx Substance Use: Yes


Substance Use Type: Reports: None


Hx Tobacco Use: Yes


Smoking Status (MU): Heavy Every Day Tobacco Smoker


Type: Cigarettes


Have You Smoked in the Last Year: Yes





Review of Systems


Constitutional: Negative


Negative: Fever, Chills, Fatigue, Skin Diaphoresis


Positive: Dental Pain


Negative: Shortness Of Breath, Cough


Genitourinary: Negative


Positive: no symptoms reported, see HPI


Negative: Arthralgia, Myalgia


Skin: Negative


Neurological: Negative


All Other Systems Reviewed And Are Negative: Yes





Physical Exam


Triage Information Reviewed: Yes


Vital Signs On Initial Exam: 


 Initial Vitals











Temp Pulse Resp BP Pulse Ox


 


 98.9 F   84   14   154/101   98 


 


 08/05/18 13:05  08/05/18 13:05  08/05/18 13:05  08/05/18 13:05  08/05/18 13:05











Vital Signs Reviewed: Yes


Appearance: Positive: Well-Appearing, Well-Nourished


Skin: Positive: Warm, Skin Color Reflects Adequate Perfusion


Head/Face: Positive: Normal Head/Face Inspection


Eyes: Positive: EOMI, DELL, Conjunctiva Clear


Dental: Positive: Gross Decay/Caries @ - throughout, Dental Fracture @ - 

throughout, Other - No evidence of inflamed gingiva or abscess


Respiratory/Lung Sounds: Positive: Clear to Auscultation, Breath Sounds Present


Cardiovascular: Positive: RRR, Pulses are Symmetrical in both Upper and Lower 

Extremities


Musculoskeletal: Positive: Strength/ROM Intact


Neurological: Positive: Sensory/Motor Intact, Alert, Oriented to Person Place, 

Time, Speech Normal


Psychiatric: Positive: Normal, Affect/Mood Appropriate


AVPU Assessment: Alert





Diagnostics





- Vital Signs


 Vital Signs











  Temp Pulse Resp BP Pulse Ox


 


 08/05/18 15:07  97.7 F  76  16  145/87  99


 


 08/05/18 13:05  98.9 F  84  14  154/101  98














- Laboratory


Lab Statement: Any lab studies that have been ordered have been reviewed, and 

results considered in the medical decision making process.





EENT Course/Dx





- Course


Course Of Treatment: Course of treatment, the patient is evaluated for left-

sided lower dental pain which has been present and worsening 2 weeks.  Digital 

block was performed upon last visit and she is requesting this again.  Dr. Pierce performed inferior alveolar block with good effect.  I have not 

prescribed her more pain management she has pain medications at home, however I 

have advised she increase her pain control to hydrocodone 10 mg 3 times daily 

as opposed to hydrocodone 5 mg 3 times daily.





- Differential Diagnoses


Differential Diagnoses: Dental Abscess, Dental Caries, Mandibular/Maxillary 

Trauma, Odontogenic Pain, Pain of Unknown Etiology





- Diagnoses


Provider Diagnoses: 


 Pain, dental








Discharge





- Sign-Out/Discharge


Documenting (check all that apply): Patient Departure





- Discharge Plan


Condition: Stable


Disposition: HOME


Referrals: 


Gabrielle Villanueva MD [Primary Care Provider] - 


Additional Instructions: 


I advise increasing your pain medication to 2 tabs 3 times daily as opposed to 

1 tab 3 times daily, you may also take 1 tab up to every 4 hours as an 

alternative.


Do not take Tylenol while taking this medication [place]


Ibuprofen 600 mg 3 times daily


Continue with Anbesol and cloves


It is very important to follow-up with your dentist ASAP





- Billing Disposition and Condition


Condition: STABLE


Disposition: Home

## 2018-08-07 ENCOUNTER — HOSPITAL ENCOUNTER (EMERGENCY)
Dept: HOSPITAL 25 - ED | Age: 38
Discharge: HOME | End: 2018-08-07
Payer: MEDICARE

## 2018-08-07 VITALS — DIASTOLIC BLOOD PRESSURE: 98 MMHG | SYSTOLIC BLOOD PRESSURE: 135 MMHG

## 2018-08-07 DIAGNOSIS — Z72.0: ICD-10-CM

## 2018-08-07 DIAGNOSIS — K08.89: Primary | ICD-10-CM

## 2018-08-07 PROCEDURE — 99282 EMERGENCY DEPT VISIT SF MDM: CPT

## 2018-08-07 PROCEDURE — 96372 THER/PROPH/DIAG INJ SC/IM: CPT

## 2018-08-07 NOTE — ED
Throat Pain/Nasal Congestion





- HPI Summary


HPI Summary: 





Complains of left upper toothache x 1 week.  Seen here on 8/1, 8/5, for same.  

Appt with dentist tomorrow. Pain not controlled by percocet 10mg PO TID. 

Looking for nerve block.  Last Percocet 10 mg at 5:30 PM today.  Denies fever, 

cough, sore throat, CP, SOB, N/V/D, abdominal pain, change in urinary BM.  

Denies purulent discharge from her mouth.  Medical history is none.





- History of Current Complaint


Chief Complaint: EDDentalPain


Time Seen by Provider: 08/07/18 21:39


Hx Obtained From: Patient


Onset/Duration: Gradual Onset


Severity: Severe


Associated Signs And Symptoms: Positive: Negative


Cough: None





- Allergies/Home Medications


Allergies/Adverse Reactions: 


 Allergies











Allergy/AdvReac Type Severity Reaction Status Date / Time


 


No Known Allergies Allergy   Verified 08/07/18 22:21














PMH/Surg Hx/FS Hx/Imm Hx


Endocrine/Hematology History: 


   Denies: Hx Anticoagulant Therapy, Hx Diabetes, Hx Systemic Lupus 

Erythematosus, Hx Thyroid Disease


Cardiovascular History: 


   Denies: Hx Hypertension


Respiratory History: 


   Denies: Hx Asthma, Hx Chronic Obstructive Pulmonary Disease (COPD)


GI History: Reports: Hx Cirrhosis - says may have non ETOH related?, Hx 

Diverticulosis - says may have?


   Denies: Hx Ulcer


 History: 


   Denies: Hx Renal Disease


Musculoskeletal History: 


   Denies: Hx Rheumatoid Arthritis


Sensory History: Reports: Hx Contacts or Glasses


   Denies: Hx Hearing Aid


Opthamlomology History: Reports: Hx Contacts or Glasses


Psychiatric History: Reports: Hx Anxiety, Hx Eating Disorder, Hx Depression, Hx 

Panic Disorder, Hx Inpatient Treatment, Hx Community Mental Health Tx, Hx 

Bipolar Disorder, Hx Substance Abuse, Other Psychiatric Issues/Disorders


   Denies: Hx Attention Deficit Hyperactivity Disorder, Hx Post Traumatic 

Stress Disorder, Hx Schizophrenia, Hx Suicide Attempt, Hx of Violent Episodes 

Against Others





- Cancer History


Cancer Type, Location and Year: patient gives pressured, bizarre account of 

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 

result of mets.


Hx Chemotherapy: No


Hx Radiation Therapy: No


Hx Palliative Cancer Treatment: No





- Surgical History


Surgery Procedure, Year, and Place: 2001 - RIGHT Lymph nodes removed to biopsy 

thought she had cancer - she didn't.  2011- C section


Infectious Disease History: No


Infectious Disease History: 


   Denies: Hx Clostridium Difficile, Hx Hepatitis, Hx Human Immunodeficiency 

Virus (HIV), Hx of Known/Suspected MRSA, Hx Shingles, Hx Tuberculosis, Hx Known/

Suspected VRE, Hx Known/Suspected VRSA, History Other Infectious Disease, 

Traveled Outside the [place] in Last 30 Days





- Family History


Known Family History: Positive: Diabetes - Father , Other - CVA mother





- Social History


Alcohol Use: Occasionally


Alcohol Amount: 1-2 drinks, 3-4 times per year


Hx Substance Use: Yes


Substance Use Type: Reports: None


Hx Tobacco Use: Yes


Smoking Status (MU): Heavy Every Day Tobacco Smoker


Type: Cigarettes


Have You Smoked in the Last Year: Yes





Review of Systems


Constitutional: Negative


Eyes: Negative


Positive: Dental Pain


Cardiovascular: Negative


Respiratory: Negative


Gastrointestinal: Negative


Genitourinary: Negative


Musculoskeletal: Negative


Skin: Negative


Neurological: Negative


Psychological: Normal


All Other Systems Reviewed And Are Negative: Yes





Physical Exam





- Summary


Physical Exam Summary: 





No indication of oral lesions, swelling of gums, dental abscess.  Oropharynx 

normal


Triage Information Reviewed: Yes


Vital Signs On Initial Exam: 


 Initial Vitals











Temp Pulse Resp BP Pulse Ox


 


 98.4 F   90   18   136/94   97 


 


 08/07/18 20:07  08/07/18 20:07  08/07/18 20:07  08/07/18 20:07  08/07/18 20:07











Vital Signs Reviewed: Yes


Appearance: Positive: Well-Appearing


Skin: Positive: Warm


Head/Face: Positive: Normal Head/Face Inspection


Eyes: Positive: Normal


ENT: Positive: Normal ENT inspection


Dental: Positive: Gross Decay/Caries @


Neck: Positive: Supple


Respiratory/Lung Sounds: Positive: Clear to Auscultation


Cardiovascular: Positive: Normal


Abdomen Description: Positive: Nontender


Musculoskeletal: Positive: Normal


Neurological: Positive: Normal


Psychiatric: Positive: Normal


AVPU Assessment: Alert





- [place] Coma Scale


Best Eye Response: 4 - Spontaneous


Best Motor Response: 6 - Obeys Commands


Best Verbal Response: 5 - Oriented


Coma Scale Total: 15





Diagnostics





- Vital Signs


 Vital Signs











  Temp Pulse Resp BP Pulse Ox


 


 08/07/18 20:07  98.4 F  90  18  136/94  97














- Laboratory


Lab Statement: Any lab studies that have been ordered have been reviewed, and 

results considered in the medical decision making process.





EENT Course/Dx





- Course


Course Of Treatment: Complains of left upper toothache x 1 week.  Seen here on 8 /1, 8/5, for same.  Appt with dentist tomorrow. Pain not controlled by percocet 

10mg PO TID. Looking for nerve block.  Last Percocet 10 mg at 5:30 PM today.  

Denies fever, cough, sore throat, CP, SOB, N/V/D, abdominal pain, change in 

urinary BM.  Denies purulent discharge from her mouth.  Medical history is 

none.  Physical exam:No indication of oral lesions, swelling of gums, dental 

abscess.  Oropharynx normal.  Patient given shot of Toradol.  dentist 

appointment at 10:45 AM tomorrow





- Diagnoses


Provider Diagnoses: 


 Toothache








Discharge





- Sign-Out/Discharge


Documenting (check all that apply): Patient Departure





- Discharge Plan


Condition: Stable


Disposition: HOME


Patient Education Materials:  Toothache (ED)


Referrals: 


Gabrielle Villanueva MD [Primary Care Provider] - 


Additional Instructions: 


You may take another Percocet 10 mg by mouth at midnight tonight.  Follow-up 

with dentist at her appointment tomorrow morning.  Return to the ED for any new 

or worsening symptoms





- Billing Disposition and Condition


Condition: STABLE


Disposition: Home

## 2019-10-26 ENCOUNTER — HOSPITAL ENCOUNTER (EMERGENCY)
Dept: HOSPITAL 25 - ED | Age: 39
Discharge: HOME | End: 2019-10-26
Payer: MEDICARE

## 2019-10-26 VITALS — SYSTOLIC BLOOD PRESSURE: 129 MMHG | DIASTOLIC BLOOD PRESSURE: 96 MMHG

## 2019-10-26 DIAGNOSIS — F17.210: ICD-10-CM

## 2019-10-26 DIAGNOSIS — B34.9: Primary | ICD-10-CM

## 2019-10-26 DIAGNOSIS — J18.9: ICD-10-CM

## 2019-10-26 LAB
ALBUMIN SERPL BCG-MCNC: 4.1 G/DL (ref 3.2–5.2)
ALBUMIN/GLOB SERPL: 1.4 {RATIO} (ref 1–3)
ALP SERPL-CCNC: 80 U/L (ref 34–104)
ALT SERPL W P-5'-P-CCNC: 12 U/L (ref 7–52)
ANION GAP SERPL CALC-SCNC: 7 MMOL/L (ref 2–11)
AST SERPL-CCNC: 12 U/L (ref 13–39)
BASOPHILS # BLD AUTO: 0 10^3/UL (ref 0–0.2)
BUN SERPL-MCNC: 8 MG/DL (ref 6–24)
BUN/CREAT SERPL: 11.8 (ref 8–20)
CALCIUM SERPL-MCNC: 9.3 MG/DL (ref 8.6–10.3)
CHLORIDE SERPL-SCNC: 106 MMOL/L (ref 101–111)
EOSINOPHIL # BLD AUTO: 0.3 10^3/UL (ref 0–0.6)
FLUAV RNA SPEC QL NAA+PROBE: NEGATIVE
FLUBV RNA SPEC QL NAA+PROBE: NEGATIVE
GLOBULIN SER CALC-MCNC: 2.9 G/DL (ref 2–4)
GLUCOSE SERPL-MCNC: 105 MG/DL (ref 70–100)
HCG SERPL QL: < 0.6 MIU/ML
HCO3 SERPL-SCNC: 21 MMOL/L (ref 22–32)
HCT VFR BLD AUTO: 45 % (ref 35–47)
HGB BLD-MCNC: 15.2 G/DL (ref 12–16)
LYMPHOCYTES # BLD AUTO: 1.6 10^3/UL (ref 1–4.8)
MCH RBC QN AUTO: 32 PG (ref 27–31)
MCHC RBC AUTO-ENTMCNC: 34 G/DL (ref 31–36)
MCV RBC AUTO: 95 FL (ref 80–97)
MONOCYTES # BLD AUTO: 0.9 10^3/UL (ref 0–0.8)
NEUTROPHILS # BLD AUTO: 10.6 10^3/UL (ref 1.5–7.7)
NRBC # BLD AUTO: 0 10^3/UL
NRBC BLD QL AUTO: 0
PLATELET # BLD AUTO: 255 10^3/UL (ref 150–450)
POTASSIUM SERPL-SCNC: 4.1 MMOL/L (ref 3.5–5)
PROT SERPL-MCNC: 7 G/DL (ref 6.4–8.9)
RBC # BLD AUTO: 4.78 10^6 /UL (ref 3.7–4.87)
SODIUM SERPL-SCNC: 134 MMOL/L (ref 135–145)
WBC # BLD AUTO: 13.4 10^3/UL (ref 3.5–10.8)

## 2019-10-26 PROCEDURE — 83605 ASSAY OF LACTIC ACID: CPT

## 2019-10-26 PROCEDURE — 36415 COLL VENOUS BLD VENIPUNCTURE: CPT

## 2019-10-26 PROCEDURE — 85025 COMPLETE CBC W/AUTO DIFF WBC: CPT

## 2019-10-26 PROCEDURE — 84702 CHORIONIC GONADOTROPIN TEST: CPT

## 2019-10-26 PROCEDURE — 71045 X-RAY EXAM CHEST 1 VIEW: CPT

## 2019-10-26 PROCEDURE — 80053 COMPREHEN METABOLIC PANEL: CPT

## 2019-10-26 PROCEDURE — 99282 EMERGENCY DEPT VISIT SF MDM: CPT

## 2019-10-26 NOTE — XMS REPORT
Summary of Care

 Created on:September 10, 2019



Patient:Leigha Foley

Sex:Female

:1980

External Reference #:[id number]





Demographics







 Address  [address] [place] [address]

 

 Mobile Phone  [phone]

 

 Email Address  [email or web address]

 

 Preferred Language  English

 

 Marital Status  Not  or 

 

 Zoroastrianism Affiliation  Unknown

 

 Race  White

 

 Ethnic Group  Not  or 









Author







 Organization  [facility]

 

 Address  [address] [place] [address]









Support







 Name  Relationship  Address  Phone

 

 Simone Foley  Unavailable  Unavailable  [phone]

 

 Simone Foley  Unavailable  Unavailable  [phone]









Care Team Providers







 Name  Role  Phone

 

 Gabrielle Villanueva MD  Primary Care Provider  [phone]









Reason for Visit







 Reason  Comments

 

 Joint Swelling  bilateral ankle swelling since around 19 ( grass roots) ( 
also pt



   states she was Dx'd with strep 19 and is on cefdinir)







Encounter Details







 Date  Type  Department  Care Team  Description

 

 09/10/2019  Office Visit  [facility] [place],  Edema of both 
ankles (Primary Dx);



     Practice



  PA-C



  Spotting;



     [address]



  [address]



  Malaise and fatigue



     [place] [address]



  



     [phone] (Fax)  







Allergies







 Active Allergy  Reactions  Severity  Noted Date  Comments

 

 Varenicline Tartrate  CNS Reaction    2013  luis

 

 Hydrocodone  GI Reaction    2013  Stomach upset

 

 Serotonin Reuptake Inhibitors  CNS Reaction    2013  Luis



documented as of this encounter (statuses as of 09/10/2019)



Medications







 Medication  Sig  Dispensed  Refills  Start Date  End Date  Status

 

 divalproex sodium  Take 1,500 mg by    0      Active



 (DEPAKOTE) 500 MG  mouth EVERY          



 Oral Tab EC  BEDTIME.          

 

 clonazePAM (KLONOPIN)  Take 1 mg by mouth    0      Active



 1 MG Oral Tab  AS NEEDED.          

 

 fluocinonide (LIDEX)  applly twice daily  120 g  5  2018    Active



 0.05 % Apply  sparingly          



 externally            



 CreamIndications:            



 Eczema, unspecified            



 type            

 

 naproxen sodium  Take 2 Tabs by  120 Tab  3  2018    Active



 (ANAPROX) 275 MG Oral  mouth TWO TIMES          



 TabIndications: Other  DAILY WITH MEALS.          



 chronic pain            

 

 Levonorgestrel  Insert IU with    0  10/07/2017  10/07/2023  Active



 (MIRENA, 52 MG,) 20  paracervical block          



 MCG/24HR Intrauterine            



 IUD            

 

 acetaminophen (APAP  Take 2 Tabs by  100 Tab  0  2019    Active



 EXTRA STRENGTH) 500  mouth EVERY SIX          



 MG Oral Tab  HOURS AS NEEDED          



   (abdominal pain).          



   MDD -  4          

 

 ibuprofen (MOTRIN)  Take 0.75 Tabs by  90 Tab  2  2019    Active



 800 MG Oral Tab  mouth EVERY EIGHT          



   HOURS AS NEEDED          



   (pain).          

 

 fluticasone (FLONASE)  Spray 2 Sprays in  1 Bottle  2  2019    Active



 50 MCG/ACT Nasal  nose DAILY.          



 Suspension            

 

 ondansetron (ZOFRAN  Take 1 Tab by  30 Tab  2  2019    Active



 ODT) 4 MG Oral TABLET  mouth EVERY SIX          



 DISPERSIBLE  HOURS AS NEEDED          



   (nausea).          

 

 buPROPion (WELLBUTRIN  Take 300 mg by    0      Active



 XL) 300 MG Oral  mouth EVERY          



 TABLET SR 24 HR  BEDTIME.          

 

 buPROPion  Take 100 mg by    0      Active



 (WELLBUTRIN) 100 MG  mouth DAILY.          



 Oral Tab            

 

 clonazePAM (KLONOPIN)  Take 0.5 mg by    0      Active



 0.5 MG Oral Tab  mouth AS NEEDED.          

 

 OXYcodone-acetaminoph  Take 1 Tab by  100 Tab  0  2019    Active



 en (PERCOCET) 5-325  mouth EVERY SIX          



 MG Oral  HOURS AS NEEDED          



 TabIndications:  (pain). Max Daily          



 Abdominal pain,  Amount: 4 Tabs.          



 unspecified abdominal            



 location            

 

 Cefdinir (OMNICEF)  TAKE 1 CAPSULE BY    0  2019    Active



 300 MG Oral Cap  MOUTH EVERY 12          



   HOURS FOR 7 DAYS          









 Hospital, Clinic, or  Ordered Dose  Route  Frequency  Start Date  End Date  
Status



 Other Facility            



 Administered Medication            

 

 ibuprofen (MOTRIN)  600 mg  PO  TID WITH MEALS  2018    Active



 tablet 600 mg            



documented as of this encounter (statuses as of 09/10/2019)



Active Problems







 Problem  Noted Date

 

 Chronic pelvic pain in female  2019









 Overview: 







 Added automatically from request for surgery [id number]









 Eczema  2013

 

 Bipolar I disorder  2013









 Overview: 



Borderline personality



 disabled









 Sprain of neck  2013

 

 Shoulder injury  2013









 Overview: 







 Atrophy os the muscles on the right from a surgery of the neck









 Family history of brain aneurysm  2013









 Overview: 







 Mother and grandmother  - both at age 40s









 Family history of diabetes mellitus  2013

 

 Smoking  2013

 

 BMI 31.0-31.9,adult  2013









 Overview: 







 This patient's BMI has been calculated and is above average, and BMI 
management plan



 is completed.  General patient education discussion including:  obesity-related



 excess mortality



documented as of this encounter (statuses as of 09/10/2019)



Social History







 Tobacco Use  Types  Packs/Day  Years Used  Date

 

 Current Every Day Smoker  Cigarettes  1  18  









 Smokeless Tobacco: Never Used      









 Alcohol Use  Drinks/Week  oz/Week  Comments

 

 No  0 Standard drinks or equivalent  0.0  









 Sex Assigned at Birth  Date Recorded

 

 Not on file  









 Job Start Date  Occupation  Industry

 

 Not on file  Not on file  Not on file









 Travel History  Travel Start  Travel End









 No recent travel history available.



documented as of this encounter



Last Filed Vital Signs







 Vital Sign  Reading  Time Taken  Comments

 

 Blood Pressure  138/88  09/10/2019  8:23 AM EDT  

 

 Pulse  96  09/10/2019  8:23 AM EDT  

 

 Temperature  37.6 

  09/10/2019  8:23 AM EDT  



   C (99.6 

    



   F)    

 

 Respiratory Rate  -  -  

 

 Oxygen Saturation  98%  09/10/2019  8:23 AM EDT  

 

 Inhaled Oxygen Concentration  -  -  

 

 Weight  64.4 kg (142 lb)  09/10/2019  8:23 AM EDT  

 

 Height  157.5 cm (5' 2")  09/10/2019  8:23 AM EDT  

 

 Body Mass Index  25.97  09/10/2019  8:23 AM EDT  



documented in this encounter



Patient Instructions

Patient InstructionsDodgFlori bhatti PA-C - 09/10/2019  8:40 AM EDT1. Long 
discussion with patient about salty foods and keeping well hydrated

Drink 4-5 glasses of water daily, avoid salty foods -- lunch meat, cheese

Elevate legs in evening, wear good support shoes during day

2. Ordered HCG -- will do now

3. Ordered labs -- will do now -- will call with resultsElectronically signed 
by Flori Delong PA-C at 09/10/2019  9:19 AM EDT

documented in this encounter



Progress Notes

Flori Delong PA-C - 09/10/2019  8:40 AM EDTFormatting of this note might be 
different from the original.

PATIENT:  Leigha Foley

MRN:  [id number]

:  1980

DATE OF SERVICE:  9/10/2019



REFERRING PRACTITIONER:  Self

PRIMARY CARE PROVIDER:  Gabrielle Villanueva



CHIEF COMPLAINT:

Chief Complaint

Patient presents with

 Joint Swelling

  bilateral ankle swelling since around 19 ( grass roots) ( also pt states 
she was Dx'd with strep 19 and is on cefdinir)



Subjective

HISTORY OF PRESENT ILLNESS:

Legiha Foley is a 39-y.o.  female who presents with intermittent bilateral 
ankle swelling x 2 months

Says in morning swelling has resolved, but swollen again by end of day

Also went to [facility], was diagnosed with strep 19, prescribed 
cefdinir 300mg BID x 7days

Water: no very much daily

Breakfast: yogurt, granula,

Lunch: ham sliced, pepperoni, cheese

Dinner: taco salad or baked chicken, veggie, starch

Drinks 1 pot of coffee daily

Alcohol: 2x monthly

Denies fever, chills, nausea, vomiting, chest pains, SOB

Smokin.5ppd

Worried she might be pregnant -- mirana IUD was placed in Feb -- no regular 
periods, intermittent spotting

Wants pregnancy test

Very active with 3 children

Denies fever, chills, nausea, vomiting, diarrhea, chest pains, SOB





No past medical history on file.

Past Surgical History:

Procedure Laterality Date

 BIOPSY, LYMPH NODE, DEEP

  SECTION NEC

 x 1

 COLONOSCOPY N/A 2017

 Procedure: COLONOSCOPY;  Surgeon: Paulette Wei MD;  Location: [facility] MAIN OR

 EGD N/A 2017

 Procedure: ENDOSCOPY UPPER GI;  Surgeon: Paulette Wei MD;  Location: [facility] MAIN 
OR



No family history on file.

Current Outpatient Medications

Medication Sig

 acetaminophen (APAP EXTRA STRENGTH) 500 MG Oral Tab Take 2 Tabs by mouth 
EVERY SIX HOURS AS NEEDED (abdominal pain). MDD -  4

 buPROPion (WELLBUTRIN XL) 300 MG Oral TABLET SR 24 HR Take 300 mg by 
mouth EVERY BEDTIME.

 buPROPion (WELLBUTRIN) 100 MG Oral Tab Take 100 mg by mouth DAILY.

 Cefdinir (OMNICEF) 300 MG Oral Cap TAKE 1 CAPSULE BY MOUTH EVERY 12 
HOURS FOR 7 DAYS

 clonazePAM (KLONOPIN) 0.5 MG Oral Tab Take 0.5 mg by mouth AS NEEDED.

 clonazePAM (KLONOPIN) 1 MG Oral Tab Take 1 mg by mouth AS NEEDED.

 divalproex sodium (DEPAKOTE) 500 MG Oral Tab EC Take 1,500 mg by mouth 
EVERY BEDTIME.

 fluocinonide (LIDEX) 0.05 % Apply externally Cream applly twice daily 
sparingly

 fluticasone (FLONASE) 50 MCG/ACT Nasal Suspension Spray 2 Sprays in nose 
DAILY.

 ibuprofen (MOTRIN) 800 MG Oral Tab Take 0.75 Tabs by mouth EVERY EIGHT 
HOURS AS NEEDED (pain).

 Levonorgestrel (MIRENA, 52 MG,) 20 MCG/24HR Intrauterine IUD Insert IU 
with paracervical block

 naproxen sodium (ANAPROX) 275 MG Oral Tab Take 2 Tabs by mouth TWO TIMES 
DAILY WITH MEALS.

 ondansetron (ZOFRAN ODT) 4 MG Oral TABLET DISPERSIBLE Take 1 Tab by 
mouth EVERY SIX HOURS AS NEEDED (nausea).

 OXYcodone-acetaminophen (PERCOCET) 5-325 MG Oral Tab Take 1 Tab by mouth 
EVERY SIX HOURS AS NEEDED (pain). Max Daily Amount: 4 Tabs.



Current Facility-Administered Medications

Medication

 ibuprofen (MOTRIN) tablet 600 mg



Allergies

Allergen Reactions

 Chantix [Varenicline Tartrate] CNS Reaction

  luis

 Hydrocodone GI Reaction

  Stomach upset

 Serotonin Reuptake Inhibitors CNS Reaction

  Luis





Social History



Socioeconomic History

 Marital status: 

  Spouse name: Not on file

 Number of children: Not on file

 Years of education: Not on file

 Highest education level: Not on file

Occupational History

 Not on file

Social Needs

 Financial resource strain: Not on file

 Food insecurity:

  Worry: Not on file

  Inability: Not on file

 Transportation needs:

  Medical: Not on file

  Non-medical: Not on file

Tobacco Use

 Smoking status: Current Every Day Smoker

  Packs/day: 1.00

  Years: 18.00

  Pack years: 18.00

  Types: Cigarettes

 Smokeless tobacco: Never Used

Substance and Sexual Activity

 Alcohol use: No

  Alcohol/week: 0.0 standard drinks

 Drug use: No

 Sexual activity: Not Currently

  Birth control/protection: Abstinence, IUD

Lifestyle

 Physical activity:

  Days per week: Not on file

  Minutes per session: Not on file

 Stress: Not on file

Relationships

 Social connections:

  Talks on phone: Not on file

  Gets together: Not on file

  Attends Tenriism service: Not on file

  Active member of club or organization: Not on file

  Attends meetings of clubs or organizations: Not on file

  Relationship status: Not on file

 Intimate partner violence:

  Fear of current or ex partner: Not on file

  Emotionally abused: Not on file

  Physically abused: Not on file

  Forced sexual activity: Not on file

Other Topics Concern

 Not on file

Social History Narrative

 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



REVIEW OF SYSTEMS:

Skin: positive bilateral ankle swelling -- see hpi

Eyes: negative visual blurring

Ears/Nose/Throat: positive strep throat

Respiratory: negative cough

Cardiovascular: negative chest pain

Gastrointestinal: negative abdominal pain, constipation, diarrhea, nausea or 
vomiting

Genitourinary: negative burning on urination, dysuria or vaginal discharge. 
Positive intermittent spotting -- see hpi

Musculoskeletal: positive arthritis/joint pain

Neurologic: negative numbness or tingling of feet or hands

Psychiatric: positive bipolar

Hematologic/Lymphatic/Immunologic: negative allergies

Endocrine: negative diabetes or hot flashes/sweats



Objective

PHYSICAL EXAMINATION:

VITALS:  /88 (BP Location: Left arm, Patient Position: Sitting)  | Pulse 
96  | Temp 99.6 F(37.6 C)  | Ht 5' 2" (1.575 m)  | Wt 142 lb (64.4 kg)  
| SpO2 98%  | BMI 25.97 kg/m  Body mass index is 25.97 kg/m.

General appearance - alert, mild distress, cooperative, oriented times 3

Skin - Skin color, texture, turgor normal. No rashes or lesions.

Head - Normocephalic. No masses, lesions, tenderness or abnormalities

Eyes - conjunctivae/corneas clear. PERRL, EOM's intact.

Oropharynx - Lips, mucosa, and tongue normal. Teeth and gums normal. Oropharynx
  normal.

Neck - Neck supple, FROM. No cervical or supraclavicular adenopathy.  Thyroid 
normal, no enlargement

Lungs -  Good diaphragmatic excursion. Lungs clear. Chest symmetrical. Normal 
breath sounds.

Heart -  RRR. No murmurs, clicks or gallops.

Bilateral ankles: trace non-pitting edema present. NO erythema or tenderness





IMPRESSION:

  ICD-9-CM ICD-10-CM

1. Edema of both ankles 719.07 M25.471 COMPREHENSIVE METABOLIC PANEL

  M25.472 COMPREHENSIVE METABOLIC PANEL

2. Spotting 623.8 N92.0 HCG QUALITATIVE SERUM

   HCG QUALITATIVE SERUM

3. Malaise and fatigue 780.79 R53.81 CBC WITH DIFFERENTIAL

  R53.83 THYROID STIMULATING HORMONE

   THYROID STIMULATING HORMONE

   CBC WITH DIFFERENTIAL





  Plan

PLAN:

1. Long discussion with patient about salty foods and keeping well hydrated

Drink 4-5 glasses of water daily, avoid salty foods -- lunch meat, cheese

Elevate legs in evening, wear good support shoes during day

2. Ordered HCG -- will do now

3. Ordered labs -- will do now -- will call with results





Author:  Flori Delong PA-C 9/10/2019 08:14Electronically signed by Flori Delong PA-C at 09/10/2019  9:20 AM EDTdocumented in this encounter



Plan of Treatment







 Date  Type  Specialty  Care Team  Description

 

 2019  Office Visit  Family Practice  Jamee Galloway MD



  



       [address]



  



       [place] [address]



  



       [phone] (Fax)  

 

 2019  Office Visit  Internal Medicine  Gabrielle Villanueva MD



  



       [address]



  



       [place] [address]



  



       [phone] (Fax)  

 

 10/11/2019  Office Visit  Internal Medicine  Gabrielle Villanueva MD



  



       [address]



  



       [place] [address]



  



       [phone] (Fax)  









 Name  Type  Priority  Associated Diagnoses  Date/Time

 

 COMPREHENSIVE METABOLIC  Lab  Routine  Edema of both ankles  09/10/2019  8:58 
AM EDT



 PANEL        

 

 CBC WITH DIFFERENTIAL  Lab  Routine  Malaise and fatigue  09/10/2019  8:58 AM 
EDT

 

 THYROID STIMULATING HORMONE  Lab  Routine  Malaise and fatigue  09/10/2019  8:
58 AM EDT

 

 HCG QUALITATIVE SERUM  Lab  Routine  Spotting  09/10/2019  8:58 AM EDT









 Name  Type  Priority  Associated Diagnoses  Order Schedule

 

 COMPREHENSIVE METABOLIC  Lab  Routine  Edema of both ankles  Expected: 09/10/
2019



 PANEL        (Approximate),



         Expires: 09/10/2020

 

 CBC WITH DIFFERENTIAL  Lab  Routine  Malaise and fatigue  Expected: 09/10/2019



         (Approximate),



         Expires: 09/10/2020

 

 THYROID STIMULATING HORMONE  Lab  Routine  Malaise and fatigue  Expected: 09/10
/2019



         (Approximate),



         Expires: 09/10/2020

 

 HCG QUALITATIVE SERUM  Lab  Routine  Spotting  Expected: 09/10/2019



         (Approximate),



         Expires: 09/10/2020









 Health Maintenance  Due Date  Last Done  Comments

 

 MEDICARE ANNUAL WELLNESS VISIT  1980    

 

 PNEUMOCOCCAL 0-64 YRS (1 of 1  1986    



 - PPSV23)      

 

 INFLUENZA VACCINE (#1)  2019    

 

 DEPRESSION SCREENING  2020  

 

 PAP SMEAR  07/15/2022  07/15/2019,  



     2013  

 

 HPV IMMUNIZATION SERIES  Aged Out    No longer eligible based



       on patient's age to



       complete this topic

 

 MENINGOCOCCAL VACCINE IMM  Aged Out    No longer eligible based



       on patient's age to



       complete this topic



documented as of this encounter



Goals







 Goal  Patient Goal  Associated  Recent  Patient-Stated?  Author



   Type  Problems  Progress    

 

 Depression  Depression      No  [place],



 screen (PHQ-9)          MD Gabrielle



 total score < 5          









 Note: 



This is an individualized treatment (depression) goal for Leigha Foley:



 Displayed above is your goal for a depression screening (PHQ-9) score that 
would indicate good control of your depression.









 Lifestyle - Current Smoker  Lifestyle  Smoking    No  Gabrielle Villanueva MD









 Note: 



Smoking Cessation Plan



 



 Discussed smoking cessation with patient. Patient readiness to quit:yes



 



 Discussed smoking cessation plan according to AHRQ guidelines:counseled 
patient on the risks of tobacco use



 



 My Quit Plan:



 



 

 My quit date is set for asap



 



 

 Notify my friends, family, and co-workers about decision to quit. Will ask 
for their support and understanding



 



 

 Remove tobacco products from my environment. I will ask people not to smoke 
around me or in my home.



 



 

 I will anticipate challenges at the beginning and will try not to be 
discouraged. To remember the benefits of quitting such as improved health, 
feeling better about myself, saving money, etc.



 



 

 Reducing stressors and avoiding triggers are essential keys to my success



 



 

 Finding ways to distract myself when I have the urge to smoke such as taking 
a walk, reading, playing a board game, putting together a puzzle, etc.



 



 

 Taking medications as my healthcare provider has advised to help alleviate 
the urge to smoke. If I am unable to take the medication, I will discuss 
further with my healthcare provider.



 



 

 Recognize reasons for relapse in my past attempts. What did and did not work 
for me



 



 

 Consider connecting with group, individual, or telephone counseling



 



 .









 Keep a regular sleep schedule  Lifestyle      No  Gabrielle Villanueva MD









 Note: 



This is an individualized lifestyle goal for Leigha Foley:



 Please maintain a regular sleep schedule.  This may help with some symptoms of 
depression.









 Take all prescribed medications as directed  Self-management      Gabrielle Dougherty MD









 Note: 



This is an individualized self-management goal for Leigha Foley:



 Please take all prescribed medications as directed.



 1.  Do not skip doses.  If you cannot afford your medications, talk with your 
doctor.



 2.  Use a pill reminder system such as a pill box if needed.  Your pharmacist 
can help you with this.



 3.  Contact your Pharmacy 5 days before your medication runs out.  If you 
cannot take your medications for any reasons, talk with your doctor.



 4.  Please bring all of your medication bottles and inhalers (or a list of all 
your medications/inhalers) with you to every visit.



 Potential barriers to meeting all of your care plan goals will continue to be 
addressed on an ongoing basis.



documented as of this encounter



Results

Not on filedocumented in this encounter



Visit Diagnoses







 Diagnosis

 

 Edema of both ankles - Primary

 

 Spotting







 Other specified noninflammatory disorder of vagina

 

 Malaise and fatigue







 Other malaise and fatigue



documented in this encounter



Insurance







 Payer  Benefit Plan /  Subscriber ID  Effective Dates  Phone  Address  Type



   Group          

 

 MEDICARE  MEDICARE PART A  xxxxxxxxxxx  2011-Present      Medicare



   & B          

 

 MEDICAID [facility]  xxxxxxxx  10/1/2017-Present      Medicaid [place]



   MEDICAID          









 Guarantor Name  Account Type  Relation to  Date of  Phone  Billing Address



     Patient  Birth    

 

 Leigha Foley  Personal/Family    1980  [phone]  [address]



         (Work)  [place], [place]



           [id number]



documented as of this encounter

## 2019-10-26 NOTE — ED
Influenza-Like Illness





- HPI Summary


HPI Summary: 


This pt is a 40 Y/O F presenting to [facility] with a CC of flu like symptoms. She 

states that she has had a sore throat since 10/24/19 but it progressively got 

worse. She states that at 0400 this morning she developed chills, nausea, 

dizziness, myalgia, arthralgia, nasal congestion, productive cough, rhinorrhea, 

and a headache. She states that her body aches and nasal congestion is rated a 7

/10 in severity. Currently she states no aggravating or alleviating factors. 

She has a PMHx of diverticulosis and Cirrhosis. She has a SHx of smoking tobacco

, marijuana, and drinking occasionally. 





- History of Current Complaint


Chief Complaint: EDFluSymptoms


Time Seen by Provider: 10/26/19 05:47


Hx Obtained From: Patient


Onset/Duration: Sudden Onset, Lasting Days - 2, Still Present, Worse Since - 

0400 today


Severity: Moderate - 7/10


Associated Signs & Symptoms: Fever, Myalgia, Cough - productive, Sore Throat, 

Nasal Congestion, Headache, Vomiting


Related Hx: Smoking





- Allergy/Home Medications


Allergies/Adverse Reactions: 


 Allergies











Allergy/AdvReac Type Severity Reaction Status Date / Time


 


No Known Allergies Allergy   Verified 08/07/18 22:21














PMH/Surg Hx/FS Hx/Imm Hx


Previously Healthy: Yes


Endocrine/Hematology History: 


   Denies: Hx Anticoagulant Therapy, Hx Diabetes, Hx Systemic Lupus 

Erythematosus, Hx Thyroid Disease


Cardiovascular History: 


   Denies: Hx Hypertension


Respiratory History: 


   Denies: Hx Asthma, Hx Chronic Obstructive Pulmonary Disease (COPD)


GI History: Reports: Hx Cirrhosis - says may have non ETOH related?, Hx 

Diverticulosis - says may have?


   Denies: Hx Ulcer


 History: 


   Denies: Hx Renal Disease


Musculoskeletal History: 


   Denies: Hx Rheumatoid Arthritis


Sensory History: Reports: Hx Contacts or Glasses


   Denies: Hx Hearing Aid


Opthamlomology History: Reports: Hx Contacts or Glasses


Psychiatric History: Reports: Hx Anxiety, Hx Eating Disorder, Hx Depression, Hx 

Panic Disorder, Hx Inpatient Treatment, Hx Community Mental Health Tx, Hx 

Bipolar Disorder, Hx Substance Abuse, Other Psychiatric Issues/Disorders


   Denies: Hx Attention Deficit Hyperactivity Disorder, Hx Post Traumatic 

Stress Disorder, Hx Schizophrenia, Hx Suicide Attempt, Hx of Violent Episodes 

Against Others





- Cancer History


Cancer Type, Location and Year: patient gives pressured, bizarre account of 

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 

result of mets.


Hx Chemotherapy: No


Hx Radiation Therapy: No


Hx Palliative Cancer Treatment: No





- Surgical History


Surgery Procedure, Year, and Place: 2001 - RIGHT Lymph nodes removed to biopsy 

thought she had cancer - she didn't.  2011- C section





- Immunization History


Immunizations Up to Date: Yes


Infectious Disease History: No


Infectious Disease History: 


   Denies: Hx Clostridium Difficile, Hx Hepatitis, Hx Human Immunodeficiency 

Virus (HIV), Hx of Known/Suspected MRSA, Hx Shingles, Hx Tuberculosis, Hx Known/

Suspected VRE, Hx Known/Suspected VRSA, History Other Infectious Disease, 

Traveled Outside the [place] in Last 30 Days





- Family History


Known Family History: Positive: Diabetes - Father , Other - CVA mother





- Social History


Alcohol Use: Occasionally


Alcohol Amount: 1-2 drinks, 3-4 times per year


Hx Substance Use: Yes


Substance Use Type: Reports: Marijuana


Hx Tobacco Use: Yes


Smoking Status (MU): Heavy Every Day Tobacco Smoker


Type: Cigarettes


Have You Smoked in the Last Year: Yes





Review of Systems


Constitutional: Other - POSITIVE: dizziness 


Positive: Chills


ENT: Other - rhinorrhea 


Positive: Cough - productive 


Positive: Nausea


Positive: Arthralgia, Myalgia


Positive: Headache


All Other Systems Reviewed And Are Negative: Yes





Physical Exam





- Summary


Physical Exam Summary: 





General: Well-developed, Mildly ill appearing female


HEENT: Normocephalic, Atraumatic. 


              Eyes: Conjuctiva normal, PERRL.


              Ears: TMs within normal limits.


              Nares: (-) discharge, (-) erythema.


              Oropharynx: Clear, mucous membranes moist, (-) exudates. 

erythematous 


Neck: Soft, FROM, (-) lymphadenopathy, (-) thyromegaly, (-) JVD.


Cardiovascular: Normal sinus rhythm, (-) murmur.


Lungs: decreased breath sounds bilaterally (-) wheezes, (-) rales, (-) rhonchi.


Abdomen: Soft, non-tender, non-distended, (-) organomegaly, normal bowel sounds.


Back: (-) CVA tenderness


Extremities: No edema.


Skin: Warm, dry, (-) rash.


Neuro: Alert and oriented x3, no focal deficits.


Psychiatric: Mood normal, affect normal. 





Triage Information Reviewed: Yes


Vital Signs On Initial Exam: 


 Initial Vitals











Temp Pulse Resp BP Pulse Ox


 


 98.2 F   102   20   150/98   97 


 


 10/26/19 04:46  10/26/19 04:46  10/26/19 04:46  10/26/19 04:46  10/26/19 04:46











Vital Signs Reviewed: Yes





Procedures





- Sedation


Patient Received Moderate/Deep Sedation with Procedure: No





Diagnostics





- Vital Signs


 Vital Signs











  Temp Pulse Resp BP Pulse Ox


 


 10/26/19 04:46  98.2 F  102  20  150/98  97














- Laboratory


Lab Results: 


 Lab Results











  10/26/19 Range/Units





  06:00 


 


Influenza A (Rapid)  Pending  


 


Influenza B (Rapid)  Pending  











Lab Statement: Any lab studies that have been ordered have been reviewed, and 

results considered in the medical decision making process.





Re-Evaluation





- Re-Evaluation


  ** First Eval


Re-Evaluation Time: 06:29


Change: Unchanged


Comment: Pt was given Ibuprofen





Flu Symptom Course/Dx





- Course


Course Of Treatment: 39-year-old female with flulike symptoms.  Given 

ibuprofen.  Workup pending.  Signed out at change of shift





- Diagnoses


Provider Diagnoses: 


 Viral illness, PNA (pneumonia)








Discharge ED





- Sign-Out/Discharge


Documenting (check all that apply): Sign-Out Patient


Signing out patient TO: Marcy Zhao





- Discharge Plan


Condition: Stable


Referrals: 


Gabrielle Villanueva MD [Primary Care Provider] - 





- Billing Disposition and Condition


Condition: STABLE





- Attestation Statements


Document Initiated by Scribe: Yes


Documenting Scribe: Michele Tucker


Provider For Whom Scribe is Documenting (Include Credential): Kathie Hearn MD 


Scribe Attestation: 


Michele MCGUIRE, scribed for Kathie Hearn MD  on 10/26/19 at 0631. 


Scribe Documentation Reviewed: Yes


Provider Attestation: 


The documentation as recorded by the Michele linn accurately reflects 

the service I personally performed and the decisions made by me, Kathie Hearn MD 


Status of Scribe Document: Viewed

## 2019-11-25 ENCOUNTER — HOSPITAL ENCOUNTER (EMERGENCY)
Dept: HOSPITAL 25 - ED | Age: 39
Discharge: HOME | End: 2019-11-25
Payer: MEDICARE

## 2019-11-25 VITALS — DIASTOLIC BLOOD PRESSURE: 87 MMHG | SYSTOLIC BLOOD PRESSURE: 128 MMHG

## 2019-11-25 DIAGNOSIS — K74.60: ICD-10-CM

## 2019-11-25 DIAGNOSIS — F17.210: ICD-10-CM

## 2019-11-25 DIAGNOSIS — F32.9: ICD-10-CM

## 2019-11-25 DIAGNOSIS — Z97.5: ICD-10-CM

## 2019-11-25 DIAGNOSIS — R10.9: Primary | ICD-10-CM

## 2019-11-25 LAB
ALBUMIN SERPL BCG-MCNC: 4 G/DL (ref 3.2–5.2)
ALBUMIN/GLOB SERPL: 1.5 {RATIO} (ref 1–3)
ALP SERPL-CCNC: 68 U/L (ref 34–104)
ALT SERPL W P-5'-P-CCNC: 10 U/L (ref 7–52)
ANION GAP SERPL CALC-SCNC: 4 MMOL/L (ref 2–11)
AST SERPL-CCNC: 12 U/L (ref 13–39)
BASOPHILS # BLD AUTO: 0 10^3/UL (ref 0–0.2)
BUN SERPL-MCNC: 8 MG/DL (ref 6–24)
BUN/CREAT SERPL: 11.4 (ref 8–20)
CALCIUM SERPL-MCNC: 9.1 MG/DL (ref 8.6–10.3)
CHLORIDE SERPL-SCNC: 108 MMOL/L (ref 101–111)
EOSINOPHIL # BLD AUTO: 0.1 10^3/UL (ref 0–0.6)
GLOBULIN SER CALC-MCNC: 2.7 G/DL (ref 2–4)
GLUCOSE SERPL-MCNC: 93 MG/DL (ref 70–100)
HCG SERPL QL: < 0.6 MIU/ML
HCO3 SERPL-SCNC: 26 MMOL/L (ref 22–32)
HCT VFR BLD AUTO: 44 % (ref 35–47)
HGB BLD-MCNC: 15.4 G/DL (ref 12–16)
LYMPHOCYTES # BLD AUTO: 1.5 10^3/UL (ref 1–4.8)
MAGNESIUM SERPL-MCNC: 1.9 MG/DL (ref 1.9–2.7)
MCH RBC QN AUTO: 32 PG (ref 27–31)
MCHC RBC AUTO-ENTMCNC: 35 G/DL (ref 31–36)
MCV RBC AUTO: 92 FL (ref 80–97)
MONOCYTES # BLD AUTO: 0.4 10^3/UL (ref 0–0.8)
NEUTROPHILS # BLD AUTO: 2.9 10^3/UL (ref 1.5–7.7)
NRBC # BLD AUTO: 0 10^3/UL
NRBC BLD QL AUTO: 0.1
PLATELET # BLD AUTO: 215 10^3/UL (ref 150–450)
POTASSIUM SERPL-SCNC: 4 MMOL/L (ref 3.5–5)
PROT SERPL-MCNC: 6.7 G/DL (ref 6.4–8.9)
RBC # BLD AUTO: 4.75 10^6 /UL (ref 3.7–4.87)
RBC UR QL AUTO: (no result)
SODIUM SERPL-SCNC: 138 MMOL/L (ref 135–145)
WBC # BLD AUTO: 5 10^3/UL (ref 3.5–10.8)
WBC UR QL AUTO: (no result)

## 2019-11-25 PROCEDURE — 74177 CT ABD & PELVIS W/CONTRAST: CPT

## 2019-11-25 PROCEDURE — 36415 COLL VENOUS BLD VENIPUNCTURE: CPT

## 2019-11-25 PROCEDURE — 83605 ASSAY OF LACTIC ACID: CPT

## 2019-11-25 PROCEDURE — 85025 COMPLETE CBC W/AUTO DIFF WBC: CPT

## 2019-11-25 PROCEDURE — 87086 URINE CULTURE/COLONY COUNT: CPT

## 2019-11-25 PROCEDURE — 84702 CHORIONIC GONADOTROPIN TEST: CPT

## 2019-11-25 PROCEDURE — 83690 ASSAY OF LIPASE: CPT

## 2019-11-25 PROCEDURE — 86140 C-REACTIVE PROTEIN: CPT

## 2019-11-25 PROCEDURE — 80053 COMPREHEN METABOLIC PANEL: CPT

## 2019-11-25 PROCEDURE — 81003 URINALYSIS AUTO W/O SCOPE: CPT

## 2019-11-25 PROCEDURE — 99283 EMERGENCY DEPT VISIT LOW MDM: CPT

## 2019-11-25 PROCEDURE — 83735 ASSAY OF MAGNESIUM: CPT

## 2019-11-25 PROCEDURE — 81015 MICROSCOPIC EXAM OF URINE: CPT

## 2019-11-25 NOTE — XMS REPORT
Summary of Care

 Created on:2019



Patient:Leigha Foley

Sex:Female

:1980

External Reference #:[id number]





Demographics







 Address  [address] [place] [address]

 

 Mobile Phone  [phone]

 

 Email Address  [email or web address]

 

 Preferred Language  English

 

 Marital Status  Not  or 

 

 Faith Affiliation  Unknown

 

 Race  White

 

 Ethnic Group  Not  or 









Author







 Organization  [facility]

 

 Address  [address] [facility]



   [place] [address]









Support







 Name  Relationship  Address  Phone

 

 Simone Foley  Unavailable  Unavailable  [phone]

 

 Simone Foley  Unavailable  Unavailable  [phone]









Care Team Providers







 Name  Role  Phone

 

 Gabrielle Villanueva  Primary Care Provider  [phone]









Reason for Referral

Medication Prior Authorization (Routine)





 Status  Reason  Specialty  Diagnoses / Procedures  Referred By Contact  
Referred To Contact

 

 Closed        Gabrielle Villanueva MD



  



         [address] [place] [address]



  



         Phone: [phone]



  



         Fax: [phone]  









Reason for Visit







 Reason  Comments

 

 Hospital F/U  pneumonia - not resolving - vomiting, diarrhea, body aches

 

 Medication Refill  Reference #: [id number]







Encounter Details







 Date  Type  Department  Care Team  Description

 

 2019  Office Visit  [facility]  Gabrielle Villanueva MD



  Pneumonia due to organism (Primary Dx);



     Medicine



  [address] [facility]



  Abdominal pain, unspecified abdominal location;



     [address] [place] [address]



  Diarrhea, unspecified type



     [place] [address]



  [phone] (Fax)  







Allergies







 Active Allergy  Reactions  Severity  Noted Date  Comments

 

 Varenicline Tartrate  CNS Reaction    2013  luis

 

 Hydrocodone  GI Reaction    2013  Stomach upset

 

 Serotonin Reuptake Inhibitors  CNS Reaction    2013  Luis



documented as of this encounter (statuses as of 2019)



Medications







 Medication  Sig  Dispensed  Refills  Start  End Date  Status



         Date    

 

 clonazePAM  Take 1 mg by    0      Active



 (KLONOPIN) 1 MG  mouth AS NEEDED.          



 Oral Tab            

 

 fluocinonide  applly twice  120 g  5      Active



 (LIDEX) 0.05 %  daily sparingly      8    



 Apply externally            



 CreamIndications:            



 Eczema,            



 unspecified type            

 

 naproxen sodium  Take 2 Tabs by  120 Tab  3      Active



 (ANAPROX) 275 MG  mouth TWO TIMES      8    



 Oral  DAILY WITH          



 TabIndications:  MEALS.          



 Other chronic pain            

 

 Levonorgestrel  Insert IU with    0  10/07/201  10/07/20  Active



 (MIRENA, 52 MG,)  paracervical      7  23  



 20 MCG/24HR  block          



 Intrauterine IUD            

 

 acetaminophen  Take 2 Tabs by  100 Tab  0      Active



 (APAP EXTRA  mouth EVERY SIX      9    



 STRENGTH) 500 MG  HOURS AS NEEDED          



 Oral Tab  (abdominal          



   pain). MDD -  4          

 

 ibuprofen (MOTRIN)  Take 0.75 Tabs  90 Tab  2      Active



 800 MG Oral Tab  by mouth EVERY      9    



   EIGHT HOURS AS          



   NEEDED (pain).          

 

 fluticasone  Spray 2 Sprays  1 Bottle  2      Active



 (FLONASE) 50  in nose DAILY.      9    



 MCG/ACT Nasal            



 Suspension            

 

 clonazePAM  Take 0.5 mg by    0      Active



 (KLONOPIN) 0.5 MG  mouth AS NEEDED.          



 Oral Tab            

 

 ondansetron  Take 1 Tab by  30 Tab  2      Active



 (ZOFRAN ODT) 4 MG  mouth EVERY SIX      9    



 Oral TABLET  HOURS AS NEEDED          



 DISPERSIBLE  (nausea).          

 

 OXYcodone-acetamin  Take 1 Tab by  100 Tab  0      Active



 ophen (PERCOCET)  mouth EVERY SIX      9    



 5-325 MG Oral  HOURS AS NEEDED          



 TabIndications:  (pain). Max          



 Abdominal pain,  Daily Amount: 4          



 unspecified  Tabs.          



 abdominal location            

 

 divalproex sodium  Take 1,500 mg by    0    20  Discontinued



 (DEPAKOTE) 500 MG  mouth EVERY        19  (Patient stopped



 Oral Tab EC  BEDTIME.          the medication)

 

 buPROPion  Take 300 mg by    0    20  Discontinued



 (WELLBUTRIN XL)  mouth EVERY        19  (Patient stopped



 300 MG Oral TABLET  BEDTIME.          the medication)



 SR 24 HR            

 

 buPROPion  Take 100 mg by    0    20  Discontinued



 (WELLBUTRIN) 100  mouth DAILY.        19  (Patient stopped



 MG Oral Tab            the medication)

 

 Cefdinir (OMNICEF)  TAKE 1 CAPSULE    0  20  Discontinued



 300 MG Oral Cap  BY MOUTH EVERY      9  19  (Patient stopped



   12 HOURS FOR 7          the medication)



   DAYS          

 

 ondansetron  Take 1 Tab by  30 Tab  2  20  Discontinued



 (ZOFRAN ODT) 4 MG  mouth EVERY SIX      9  19  (Reorder)



 Oral TABLET  HOURS AS NEEDED          



 DISPERSIBLE  (nausea).          

 

 OXYcodone-acetamin  Take 1 Tab by  100 Tab  0  10/08/201  11/06/20  
Discontinued



 ophen (PERCOCET)  mouth EVERY SIX      9  19  (Reorder)



 5-325 MG Oral  HOURS AS NEEDED          



 TabIndications:  (pain). Max          



 Abdominal pain,  Daily Amount: 4          



 unspecified  Tabs.          



 abdominal location            

 

 OXYcodone-acetamin  Take 1 Tab by  100 Tab  0  20  
Discontinued



 ophen (PERCOCET)  mouth EVERY SIX      9  19  (Reorder)



 5-325 MG Oral  HOURS AS NEEDED          



 TabIndications:  (pain). Max          



 Abdominal pain,  Daily Amount: 4          



 unspecified  Tabs.          



 abdominal location            









 Hospital, Clinic, or  Ordered Dose  Route  Frequency  Start Date  End Date  
Status



 Other Facility            



 Administered Medication            

 

 ibuprofen (MOTRIN)  600 mg  PO  TID WITH MEALS  2018    Active



 tablet 600 mg            



documented as of this encounter (statuses as of 2019)



Active Problems







 Problem  Noted Date

 

 Chronic pelvic pain in female  2019









 Overview: 







 Added automatically from request for surgery [id number]









 Eczema  2013

 

 Bipolar I disorder  2013









 Overview: 



Borderline personality



 disabled









 Sprain of neck  2013

 

 Shoulder injury  2013









 Overview: 







 Atrophy os the muscles on the right from a surgery of the neck









 Family history of brain aneurysm  2013









 Overview: 







 Mother and grandmother  - both at age 40s









 Family history of diabetes mellitus  2013

 

 Smoking  2013

 

 BMI 31.0-31.9,adult  2013









 Overview: 







 This patient's BMI has been calculated and is above average, and BMI 
management plan



 is completed.  General patient education discussion including:  obesity-related



 excess mortality



documented as of this encounter (statuses as of 2019)



Social History







 Tobacco Use  Types  Packs/Day  Years Used  Date

 

 Current Every Day Smoker  Cigarettes  1  18  









 Smokeless Tobacco: Never Used      









 Alcohol Use  Drinks/Week  oz/Week  Comments

 

 No  0 Standard drinks or equivalent  0.0  









 Sex Assigned at Birth  Date Recorded

 

 Not on file  









 Job Start Date  Occupation  Industry

 

 Not on file  Not on file  Not on file









 Travel History  Travel Start  Travel End









 No recent travel history available.



documented as of this encounter



Last Filed Vital Signs







 Vital Sign  Reading  Time Taken  Comments

 

 Blood Pressure  120/90  2019  9:25 AM EST  

 

 Pulse  83  2019  9:25 AM EST  

 

 Temperature  -  -  

 

 Respiratory Rate  -  -  

 

 Oxygen Saturation  99%  2019  9:25 AM EST  

 

 Inhaled Oxygen Concentration  -  -  

 

 Weight  63.2 kg (139 lb 4.8 oz)  2019  9:25 AM EST  

 

 Height  157.5 cm (5' 2")  2019  9:25 AM EST  

 

 Body Mass Index  25.48  2019  9:25 AM EST  



documented in this encounter



Patient Instructions

Patient InstructionsGabrielle Villanueva MD - 2019  9:40 AM ESTCongestion - 
upper respiratory symptoms getting slowly better -  Patient given appropriate 
expectation

Use of decongestant encouraged-

Diarrhea is concerning for Cdiff - discussed and order for stool evalutation [



Electronically signed by Gabrielle Villanueva MD at 2019 12:02 PM EST

documented in this encounter



Progress Notes

Gabrielle Villanueva MD - 2019  9:40 AM ESTFormatting of this note might be 
different from the original.

NAME:Leigha Foley

MRN: [id number]

1980: 1980

ENC Date: 2019



CC:

Chief Complaint

Patient presents with

 Hospital F/U

  pneumonia - not resolving - vomiting, diarrhea, body aches

 Medication Refill

  Reference #: [id number]



Leigha Foley is a 39-y.o. female



1 Pneumonia - seen in the ER 10/26 diagnosis with RML pneumonia  and treated 
with augmentin-

Body aches is better -  Breathing

Has diarrhea now -  Constant -

Started to vomit last pm

mildl shortness of breath

Fatigue -

Feels like head is congested-



 2. Irritable bowel syndrome - has been seen by GI and also seen by gyn for 
possible GYN origin of abdominal pain - defer discussion today -



Current Outpatient Medications

Medication Sig

 acetaminophen (APAP EXTRA STRENGTH) 500 MG Oral Tab Take 2 Tabs by mouth 
EVERY SIX HOURS AS NEEDED (abdominal pain). MDD -  4

 clonazePAM (KLONOPIN) 0.5 MG Oral Tab Take 0.5 mg by mouth AS NEEDED.

 clonazePAM (KLONOPIN) 1 MG Oral Tab Take 1 mg by mouth AS NEEDED.

 fluocinonide (LIDEX) 0.05 % Apply externally Cream applly twice daily 
sparingly

 fluticasone (FLONASE) 50 MCG/ACT Nasal Suspension Spray 2 Sprays in nose 
DAILY.

 ibuprofen (MOTRIN) 800 MG Oral Tab Take 0.75 Tabs by mouth EVERY EIGHT 
HOURS AS NEEDED (pain).

 Levonorgestrel (MIRENA, 52 MG,) 20 MCG/24HR Intrauterine IUD Insert IU 
with paracervical block

 naproxen sodium (ANAPROX) 275 MG Oral Tab Take 2 Tabs by mouth TWO TIMES 
DAILY WITH MEALS.

 ondansetron (ZOFRAN ODT) 4 MG Oral TABLET DISPERSIBLE Take 1 Tab by 
mouth EVERY SIX HOURS AS NEEDED (nausea).

 OXYcodone-acetaminophen (PERCOCET) 5-325 MG Oral Tab Take 1 Tab by mouth 
EVERY SIX HOURS AS NEEDED (pain). Max Daily Amount: 4 Tabs.



Current Facility-Administered Medications

Medication

 ibuprofen (MOTRIN) tablet 600 mg



Patient Active Problem List

 Diagnosis Date Noted

 Chronic pelvic pain in female 2019

  Added automatically from request for surgery [id number]



 Eczema 2013

 Bipolar I disorder (HCC) 2013

  Borderline personality

disabled



 Sprain of neck 2013

 Shoulder injury 2013

  Atrophy os the muscles on the right from a surgery of the neck



 Family history of brain aneurysm 2013

  Mother and grandmother  - both at age 40s



 Family history of diabetes mellitus 2013

 Smoking 2013

 BMI 31.0-31.9,adult 2013

  This patient's BMI has been calculated and is above average, and BMI 
management plan is completed. General patient education discussion including:  
obesity-related excess mortality





History reviewed. No pertinent family history.

No cardiopulmonary symptoms   No upper or lower GI complaints    No urinary 
tract symptoms.  No bruising/ bleeding. No neurological complaints .  No 
insomnia.+ .

Social History



Tobacco Use

 Smoking status: Current Every Day Smoker

  Packs/day: 1.00

  Years: 18.00

  Pack years: 18.00

  Types: Cigarettes

 Smokeless tobacco: Never Used

Substance Use Topics

 Alcohol use: No

  Alcohol/week: 0.0 standard drinks

 Drug use: No



OBJECTIVE:

/90  | Pulse 83  | Ht 5' 2" (1.575 m)  | Wt 139 lb 4.8 oz (63.2 kg)  | 
SpO2 99%  | BMI 25.48 kg/m .

Heent  Negative

Nose - congested

Sinus mild tender to palpation



Lungs Clear

CV rrr

Abd soft, nontender, no organomegaly

Ext no edema;

Neuro: intellect intact ; motor including gait unremarkable



A/P

  ICD-9-CM ICD-10-CM

1. Pneumonia due to organism 486 J18.9

2. Abdominal pain, unspecified abdominal location 789.00 R10.9 OXYcodone-
acetaminophen (PERCOCET) 5-325 MG Oral Tab

   DISCONTINUED: OXYcodone-acetaminophen (PERCOCET) 5-325 MG Oral Tab

3. Diarrhea, unspecified type 787.91 R19.7 C. DIFFICILE (STOOL)



Patient Instructions

Congestion - upper respiratory symptoms getting slowly better -  Patient given 
appropriate expectation

Use of decongestant encouraged-

Diarrhea is concerning for Cdiff - discussed and order for stool evalutation [



AUTHOR: Gabrielle Villanueva MD 12:02 2019Electronically signed by Gabrielle Villanueva MD at 2019 12:02 PM ESTdocumented in this encounter



Plan of Treatment







 Name  Type  Priority  Associated Diagnoses  Order Schedule

 

 C. DIFFICILE (STOOL)  Lab  Routine  Diarrhea, unspecified  1 Occurrences 
starting



       type  2019 until



         2020









 Health Maintenance  Due Date  Last Done  Comments

 

 MEDICARE ANNUAL WELLNESS VISIT  1980    

 

 PNEUMOCOCCAL 0-64 YRS (1 of 1  1986    



 - PPSV23)      

 

 INFLUENZA VACCINE (#1)  2019    

 

 DEPRESSION SCREENING  2020  

 

 PAP SMEAR  07/15/2022  07/15/2019,  



     2013  

 

 HPV IMMUNIZATION SERIES  Aged Out    No longer eligible based



       on patient's age to



       complete this topic

 

 MENINGOCOCCAL VACCINE IMM  Aged Out    No longer eligible based



       on patient's age to



       complete this topic



documented as of this encounter



Goals







 Goal  Patient Goal  Associated  Recent  Patient-Stated?  Author



   Type  Problems  Progress    

 

 Depression  Depression      No  [place],



 screen (PHQ-9)          MD Gabrielle



 total score < 5          









 Note: 



This is an individualized treatment (depression) goal for Leigha Foley:



 Displayed above is your goal for a depression screening (PHQ-9) score that 
would indicate good control of your depression.









 Lifestyle - Current Smoker  Lifestyle  Smoking    No  Gabrielle Villanueva MD









 Note: 



Smoking Cessation Plan



 



 Discussed smoking cessation with patient. Patient readiness to quit:yes



 



 Discussed smoking cessation plan according to AHRQ guidelines:counseled 
patient on the risks of tobacco use



 



 My Quit Plan:



 



 

 My quit date is set for asap



 



 

 Notify my friends, family, and co-workers about decision to quit. Will ask 
for their support and understanding



 



 

 Remove tobacco products from my environment. I will ask people not to smoke 
around me or in my home.



 



 

 I will anticipate challenges at the beginning and will try not to be 
discouraged. To remember the benefits of quitting such as improved health, 
feeling better about myself, saving money, etc.



 



 

 Reducing stressors and avoiding triggers are essential keys to my success



 



 

 Finding ways to distract myself when I have the urge to smoke such as taking 
a walk, reading, playing a board game, putting together a puzzle, etc.



 



 

 Taking medications as my healthcare provider has advised to help alleviate 
the urge to smoke. If I am unable to take the medication, I will discuss 
further with my healthcare provider.



 



 

 Recognize reasons for relapse in my past attempts. What did and did not work 
for me



 



 

 Consider connecting with group, individual, or telephone counseling



 



 .









 Keep a regular sleep schedule  Lifestyle      Gabrielle Dougherty MD









 Note: 



This is an individualized lifestyle goal for Leigha Foley:



 Please maintain a regular sleep schedule.  This may help with some symptoms of 
depression.









 Take all prescribed medications as directed  Self-management      Gabrielle Dougherty MD









 Note: 



This is an individualized self-management goal for Leigha Foley:



 Please take all prescribed medications as directed.



 1.  Do not skip doses.  If you cannot afford your medications, talk with your 
doctor.



 2.  Use a pill reminder system such as a pill box if needed.  Your pharmacist 
can help you with this.



 3.  Contact your Pharmacy 5 days before your medication runs out.  If you 
cannot take your medications for any reasons, talk with your doctor.



 4.  Please bring all of your medication bottles and inhalers (or a list of all 
your medications/inhalers) with you to every visit.



 Potential barriers to meeting all of your care plan goals will continue to be 
addressed on an ongoing basis.



documented as of this encounter



Results

Not on filedocumented in this encounter



Visit Diagnoses







 Diagnosis

 

 Pneumonia due to organism - Primary







 Pneumonia due to other specified organism

 

 Abdominal pain, unspecified abdominal location

 

 Diarrhea, unspecified type



documented in this encounter



Insurance







 Payer  Benefit Plan /  Subscriber ID  Effective Dates  Phone  Address  Type



   Group          

 

 MEDICARE  MEDICARE PART A  xxxxxxxxxxx  2011-Present      Medicare



   & B          

 

 [place]ICA[place]  xxxxxxxx  10/1/2017-Present      [place]icaid [place]



   MEDICAID          









 Guarantor Name  Account Type  Relation to  Date of  Phone  Billing Address



     Patient  Birth    

 

 Leigha Foley  Personal/Family    1980  [phone]  [address]



         (Work)  [place], [place]



           [id number]



documented as of this encounter

## 2019-11-25 NOTE — XMS REPORT
Summary of Care

 Created on:2019



Patient:Leigha Foley

Sex:Female

:1980

External Reference #:[id number]





Demographics







 Address  [address] [place] [address]

 

 Mobile Phone  [phone]

 

 Email Address  [email or web address]

 

 Preferred Language  English

 

 Marital Status  Not  or 

 

 Latter-day Affiliation  Unknown

 

 Race  White

 

 Ethnic Group  Not  or 









Author







 Organization  [facility]

 

 Address  [address] [place] [address]









Support







 Name  Relationship  Address  Phone

 

 Simone Foley  Unavailable  Unavailable  [phone]

 

 Simone Foley  Unavailable  Unavailable  [phone]









Care Team Providers







 Name  Role  Phone

 

 Gabrielle Villanueva  Primary Care Provider  [phone]









Reason for Visit







 Reason  Comments

 

 Diarrhea  pt states bouts of diarrhea, vomiting, and nausea. has not been able 
to eat



   or drink anything. LLQ discomfort reported

 

 Lump  pt states the lumps on the back of her head have broken, crusted, and 
oozing







Encounter Details







 Date  Type  Department  Care Team  Description

 

 2019  Office Visit  [facility], Flori,  Diarrhea, 
unspecified type (Primary Dx);



     Practice



  [facility]



  Nausea and vomiting, intractability of vomiting not specified, unspecified 
vomiting type;



     [address]



  [address]



  Subcutaneous [place]



     [place] [address]



  [place] [address]



  



     [phone]  



       (Fax)  







Allergies







 Active Allergy  Reactions  Severity  Noted Date  Comments

 

 Varenicline Tartrate  CNS Reaction    2013  luis

 

 Hydrocodone  GI Reaction    2013  Stomach upset

 

 Serotonin Reuptake Inhibitors  CNS Reaction    2013  Luis



documented as of this encounter (statuses as of 2019)



Medications







 Medication  Sig  Dispensed  Refills  Start Date  End Date  Status

 

 clonazePAM (KLONOPIN)  Take 1 mg by mouth    0      Active



 1 MG Oral Tab  AS NEEDED.          

 

 fluocinonide (LIDEX)  applly twice daily  120 g  5  2018    Active



 0.05 % Apply  sparingly          



 externally            



 CreamIndications:            



 Eczema, unspecified            



 type            

 

 naproxen sodium  Take 2 Tabs by  120 Tab  3  2018    Active



 (ANAPROX) 275 MG Oral  mouth TWO TIMES          



 TabIndications: Other  DAILY WITH MEALS.          



 chronic pain            

 

 Levonorgestrel  Insert IU with    0  10/07/2017  10/07/2023  Active



 (MIRENA, 52 MG,) 20  paracervical block          



 MCG/24HR Intrauterine            



 IUD            

 

 acetaminophen (APAP  Take 2 Tabs by  100 Tab  0  2019    Active



 EXTRA STRENGTH) 500  mouth EVERY SIX          



 MG Oral Tab  HOURS AS NEEDED          



   (abdominal pain).          



   MDD -  4          

 

 ibuprofen (MOTRIN)  Take 0.75 Tabs by  90 Tab  2  2019    Active



 800 MG Oral Tab  mouth EVERY EIGHT          



   HOURS AS NEEDED          



   (pain).          

 

 fluticasone (FLONASE)  Spray 2 Sprays in  1 Bottle  2  2019    Active



 50 MCG/ACT Nasal  nose DAILY.          



 Suspension            

 

 clonazePAM (KLONOPIN)  Take 0.5 mg by    0      Active



 0.5 MG Oral Tab  mouth AS NEEDED.          

 

 ondansetron (ZOFRAN  Take 1 Tab by  30 Tab  2  2019    Active



 ODT) 4 MG Oral TABLET  mouth EVERY SIX          



 DISPERSIBLE  HOURS AS NEEDED          



   (nausea).          

 

 OXYcodone-acetaminoph  Take 1 Tab by  100 Tab  0  2019    Active



 en (PERCOCET) 5-325  mouth EVERY SIX          



 MG Oral  HOURS AS NEEDED          



 TabIndications:  (pain). Max Daily          



 Abdominal pain,  Amount: 4 Tabs.          



 unspecified abdominal            



 location            









 Hospital, Clinic, or  Ordered Dose  Route  Frequency  Start Date  End Date  
Status



 Other Facility            



 Administered Medication            

 

 ibuprofen (MOTRIN)  600 mg  PO  TID WITH MEALS  2018    Active



 tablet 600 mg            



documented as of this encounter (statuses as of 2019)



Active Problems







 Problem  Noted Date

 

 Chronic pelvic pain in female  2019









 Overview: 







 Added automatically from request for surgery [id number]









 Eczema  2013

 

 Bipolar I disorder  2013









 Overview: 



Borderline personality



 disabled









 Sprain of neck  2013

 

 Shoulder injury  2013









 Overview: 







 Atrophy os the muscles on the right from a surgery of the neck









 Family history of brain aneurysm  2013









 Overview: 







 Mother and grandmother  - both at age 40s









 Family history of diabetes mellitus  2013

 

 Smoking  2013

 

 BMI 31.0-31.9,adult  2013









 Overview: 







 This patient's BMI has been calculated and is above average, and BMI 
management plan



 is completed.  General patient education discussion including:  obesity-related



 excess mortality



documented as of this encounter (statuses as of 2019)



Social History







 Tobacco Use  Types  Packs/Day  Years Used  Date

 

 Current Every Day Smoker  Cigarettes  1  18  









 Smokeless Tobacco: Never Used      









 Alcohol Use  Drinks/Week  oz/Week  Comments

 

 No  0 Standard drinks or equivalent  0.0  









 Sex Assigned at Birth  Date Recorded

 

 Not on file  









 Job Start Date  Occupation  Industry

 

 Not on file  Not on file  Not on file









 Travel History  Travel Start  Travel End









 No recent travel history available.



documented as of this encounter



Last Filed Vital Signs







 Vital Sign  Reading  Time Taken  Comments

 

 Blood Pressure  130/82  2019  8:35 AM EST  

 

 Pulse  102  2019  8:35 AM EST  

 

 Temperature  37.4 

  2019  8:35 AM EST  



   C (99.3 

    



   F)    

 

 Respiratory Rate  -  -  

 

 Oxygen Saturation  96%  2019  8:35 AM EST  

 

 Inhaled Oxygen Concentration  -  -  

 

 Weight  62.6 kg (138 lb)  2019  8:35 AM EST  

 

 Height  157.5 cm (5' 2")  2019  8:35 AM EST  

 

 Body Mass Index  25.24  2019  8:35 AM EST  



documented in this encounter



Patient Instructions

Patient InstructionsDoFlori hoffman PA-C - 2019  8:40 AM EST1. BRAT diet 
-- bananas, rice, apple sauce, toast

Discussed importance of not eating dairy

Keep hydrated -- small sips of water, gingerale, can suck on ice chips

Will call when stool cultures are final

2. Will discuss with DR. Villanueva at lunch time

3. Use medicated shampoo -- nizol, Tgel

Will call when US results are finalElectronically signed by Flori Delong PA- C at 2019  8:58 AM EST

documented in this encounter



Progress Notes

Flori Delong PA-C - 2019  8:40 AM ESTFormatting of this note might be 
different from the original.

PATIENT:  Leigha Foley

MRN:  [id number]

:  1980

DATE OF SERVICE:  2019



REFERRING PRACTITIONER:  Self-Referred

PRIMARY CARE PROVIDER:  Gabrielle Villanueva



CHIEF COMPLAINT:

Chief Complaint

Patient presents with

 Diarrhea

  pt states bouts of diarrhea, vomiting, and nausea. has not been able to eat 
or drink anything. LLQdiscomfort reported

 Lump

  pt states the lumps on the back of her head have broken, crusted, and oozing



Subjective

HISTORY OF PRESENT ILLNESS:

Leigha Foley is a 39-y.o.  female who presents with continuing non-bloody 
diarrhea

and lumps on back of head have opened, crusting, oozing

Last meal 2 nights ago -- chicken, stuffing, cauliflower, broccoli

Ate yogurt yesterday, "went right threw me" also vomited

Says zofran is not working

Patient was seen in office 19, labs done

Lab Results

Component Value Date

 TSH 0.67 2019



Lab Results

Component Value Date

  2019

 K 4.1 2019

  (H) 2019

 CO2 22 2019

 GLUCOSE 90 2019

 BUN 11 2019

 CREATININE 0.7 2019

 CALCIUM 9.3 2019

 TP 7.7 2019

 ALBUMIN 4.5 2019

 AST 26 2019

 ALT 27 2019

 ALK 93 2019

 TBILI 0.3 2019

 EGFR &gt;60 2019



Lab Results

Component Value Date

 WBC 9.61 2019

 HGB 15.3 2019

 HCT 46.8 (H) 2019

 PLAT 267 2019



Stool cultures ordered -- brought to lab this morning

US of lumps on back of head done 19 -- results not done yet

"Sick of being sick" -- feeling frustrated





No past medical history on file.

Past Surgical History:

Procedure Laterality Date

 BIOPSY, LYMPH NODE, DEEP

  SECTION NEC

 x 1

 COLONOSCOPY N/A 2017

 Procedure: COLONOSCOPY;  Surgeon: Paulette Wei MD;  Location: [facility] MAIN OR

 EGD N/A 2017

 Procedure: ENDOSCOPY UPPER GI;  Surgeon: Paulette Wei MD;  Location: [facility] MAIN 
OR



No family history on file.

Current Outpatient Medications

Medication Sig

 acetaminophen (APAP EXTRA STRENGTH) 500 MG Oral Tab Take 2 Tabs by mouth 
EVERY SIX HOURS AS NEEDED (abdominal pain). MDD -  4

 clonazePAM (KLONOPIN) 0.5 MG Oral Tab Take 0.5 mg by mouth AS NEEDED.

 clonazePAM (KLONOPIN) 1 MG Oral Tab Take 1 mg by mouth AS NEEDED.

 fluocinonide (LIDEX) 0.05 % Apply externally Cream applly twice daily 
sparingly

 fluticasone (FLONASE) 50 MCG/ACT Nasal Suspension Spray 2 Sprays in nose 
DAILY.

 ibuprofen (MOTRIN) 800 MG Oral Tab Take 0.75 Tabs by mouth EVERY EIGHT 
HOURS AS NEEDED (pain).

 Levonorgestrel (MIRENA, 52 MG,) 20 MCG/24HR Intrauterine IUD Insert IU 
with paracervical block

 naproxen sodium (ANAPROX) 275 MG Oral Tab Take 2 Tabs by mouth TWO TIMES 
DAILY WITH MEALS.

 ondansetron (ZOFRAN ODT) 4 MG Oral TABLET DISPERSIBLE Take 1 Tab by 
mouth EVERY SIX HOURS AS NEEDED (nausea).

 OXYcodone-acetaminophen (PERCOCET) 5-325 MG Oral Tab Take 1 Tab by mouth 
EVERY SIX HOURS AS NEEDED (pain). Max Daily Amount: 4 Tabs.



Current Facility-Administered Medications

Medication

 ibuprofen (MOTRIN) tablet 600 mg



Allergies

Allergen Reactions

 Chantix [Varenicline Tartrate] CNS Reaction

  luis

 Hydrocodone GI Reaction

  Stomach upset

 Serotonin Reuptake Inhibitors CNS Reaction

  Luis





Social History



Socioeconomic History

 Marital status: 

  Spouse name: Not on file

 Number of children: Not on file

 Years of education: Not on file

 Highest education level: Not on file

Occupational History

 Not on file

Social Needs

 Financial resource strain: Not on file

 Food insecurity:

  Worry: Not on file

  Inability: Not on file

 Transportation needs:

  Medical: Not on file

  Non-medical: Not on file

Tobacco Use

 Smoking status: Current Every Day Smoker

  Packs/day: 1.00

  Years: 18.00

  Pack years: 18.00

  Types: Cigarettes

 Smokeless tobacco: Never Used

Substance and Sexual Activity

 Alcohol use: No

  Alcohol/week: 0.0 standard drinks

 Drug use: No

 Sexual activity: Not Currently

  Birth control/protection: Abstinence, I.U.D.

Lifestyle

 Physical activity:

  Days per week: Not on file

  Minutes per session: Not on file

 Stress: Not on file

Relationships

 Social connections:

  Talks on phone: Not on file

  Gets together: Not on file

  Attends Nondenominational service: Not on file

  Active member of club or organization: Not on file

  Attends meetings of clubs or organizations: Not on file

  Relationship status: Not on file

 Intimate partner violence:

  Fear of current or ex partner: Not on file

  Emotionally abused: Not on file

  Physically abused: Not on file

  Forced sexual activity: Not on file

Other Topics Concern

 Not on file

Social History Narrative

 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



REVIEW OF SYSTEMS:

Skin: 3 tender lumps back of scalp -- have opened, oozing, crusting

Eyes: negative visual blurring

Ears/Nose/Throat: negative rhinorrhea, sore throat, sinus pressure, post nasal 
drip

Respiratory: negative cough

Cardiovascular: negative chest pain

Gastrointestinal: negative abdominal pain, constipation, Positive non-bloody 
diarrhea, nausea, vomiting

Genitourinary: negative burning on urination, dysuria or vaginal discharge

Musculoskeletal: positive arthritis/joint pain

Neurologic: negative numbness or tingling of feet or hands

Psychiatric: positive Biploar

Hematologic/Lymphatic/Immunologic: negative allergies. Positive fatigue

Endocrine: negative diabetes or hot flashes/sweats



Objective

PHYSICAL EXAMINATION:

VITALS:  /82 (BP Location: Right arm, Patient Position: Sitting)  | Pulse 
102  | Temp 99.3 F (37.4 C)  | Ht 5' 2" (1.575 m)  | Wt 138 lb (62.6 kg
)  | SpO2 96%  | BMI 25.24 kg/m  Body mass index is 25.24 kg/m.

General appearance - alert, moderate distress, cooperative, oriented times 3, 
tearful at times

Skin - Skin color, texture, turgor normal. No rashes or lesions.

Head - Normocephalic. Posterior scalp, left side: 2 small subcutaneous nodules, 
right side 1 nodule,tender, crusting

Eyes - conjunctivae/corneas clear. PERRL, EOM's intact.

Oropharynx - Lips, mucosa, and tongue normal. Teeth and gums normal. Oropharynx
  normal.

Neck - Neck supple, FROM. No cervical or supraclavicular adenopathy.  Thyroid 
normal, no enlargement

Lungs -  Good diaphragmatic excursion. Lungs clear. Chest symmetrical. Normal 
breath sounds.

Heart -  RRR. No murmurs, clicks or gallops.  No peripheral edema.

ABDOMEN:  soft, tenderness with palpation LLQ. Bowel sounds normal. No masses, 
no organomegaly.



.



IMPRESSION:

  ICD-9-CM ICD-10-CM

1. Diarrhea, unspecified type 787.91 R19.7 GIARDIA AND CRYPTOSPORIDIUM

   C. DIFFICILE (STOOL)

   STOOL CULTURE

   STOOL CULTURE

   STOOL FOR SHIGA-LIKE TOXINS CULTURE

2. Nausea and vomiting, intractability of vomiting not specified, unspecified 
vomiting type 787.01 R11.2

3. Subcutaneous nodules 782.2 R22.9





  Plan

PLAN:

1. BRAT diet -- bananas, rice, apple sauce, toast

Discussed importance of not eating dairy

Keep hydrated -- small sips of water, gingerale, can suck on ice chips

Will call when stool cultures are final

2. Will discuss with DR. Villanueva at lunch time

3. Use medicated shampoo -- nizol Tgel

Will call when US results are final





Author:  Flori Delong PA-C 2019 08:34Electronically signed by Flori Delong PA-C at 2019  8:59 AM ESTdocumented in this encounter



Plan of Treatment







 Name  Type  Priority  Associated Diagnoses  Date/Time

 

 GIARDIA AND CRYPTOSPORIDIUM  Lab  Routine  Diarrhea, unspecified  2019  7
:00 AM



       type  EST

 

 C. DIFFICILE (STOOL)  Lab  Routine  Diarrhea, unspecified  2019  7:00 AM



       type  EST

 

 STOOL CULTURE  Lab  Routine  Diarrhea, unspecified  2019  7:00 AM



       type  EST

 

 STOOL CULTURE  Lab  Routine  Diarrhea, unspecified  2019  7:00 AM



       type  EST

 

 STOOL FOR SHIGA-LIKE TOXINS  Lab  Routine  Diarrhea, unspecified  2019  7
:00 AM



 CULTURE      type  EST









 Health Maintenance  Due Date  Last Done  Comments

 

 MEDICARE ANNUAL WELLNESS VISIT  1980    

 

 PNEUMOCOCCAL 0-64 YRS (1 of 1  1986    



 - PPSV23)      

 

 INFLUENZA VACCINE (#1)  2019    

 

 DEPRESSION SCREENING  2020  

 

 PAP SMEAR  07/15/2022  07/15/2019,  



     2013  

 

 HPV IMMUNIZATION SERIES  Aged Out    No longer eligible based



       on patient's age to



       complete this topic

 

 MENINGOCOCCAL VACCINE IMM  Aged Out    No longer eligible based



       on patient's age to



       complete this topic



documented as of this encounter



Goals







 Goal  Patient Goal  Associated  Recent  Patient-Stated?  Author



   Type  Problems  Progress    

 

 Depression  Depression      No  Crepet,



 screen (PHQ-9)          MD Gabrielle



 total score < 5          









 Note: 



This is an individualized treatment (depression) goal for Leighajosué Foley:



 Displayed above is your goal for a depression screening (PHQ-9) score that 
would indicate good control of your depression.









 Lifestyle - Current Smoker  Lifestyle  Smoking    No  Gabrielle Villanueva MD









 Note: 



Smoking Cessation Plan



 



 Discussed smoking cessation with patient. Patient readiness to quit:yes



 



 Discussed smoking cessation plan according to AHRQ guidelines:counseled 
patient on the risks of tobacco use



 



 My Quit Plan:



 



 

 My quit date is set for asap



 



 

 Notify my friends, family, and co-workers about decision to quit. Will ask 
for their support and understanding



 



 

 Remove tobacco products from my environment. I will ask people not to smoke 
around me or in my home.



 



 

 I will anticipate challenges at the beginning and will try not to be 
discouraged. To remember the benefits of quitting such as improved health, 
feeling better about myself, saving money, etc.



 



 

 Reducing stressors and avoiding triggers are essential keys to my success



 



 

 Finding ways to distract myself when I have the urge to smoke such as taking 
a walk, reading, playing a board game, putting together a puzzle, etc.



 



 

 Taking medications as my healthcare provider has advised to help alleviate 
the urge to smoke. If I am unable to take the medication, I will discuss 
further with my healthcare provider.



 



 

 Recognize reasons for relapse in my past attempts. What did and did not work 
for me



 



 

 Consider connecting with group, individual, or telephone counseling



 



 .









 Keep a regular sleep schedule  Lifestyle      No  Gabrielle Villanueva MD









 Note: 



This is an individualized lifestyle goal for Leigha Foley:



 Please maintain a regular sleep schedule.  This may help with some symptoms of 
depression.









 Take all prescribed medications as directed  Self-management      Gabrielle Dougherty MD









 Note: 



This is an individualized self-management goal for Leigha Foley:



 Please take all prescribed medications as directed.



 1.  Do not skip doses.  If you cannot afford your medications, talk with your 
doctor.



 2.  Use a pill reminder system such as a pill box if needed.  Your pharmacist 
can help you with this.



 3.  Contact your Pharmacy 5 days before your medication runs out.  If you 
cannot take your medications for any reasons, talk with your doctor.



 4.  Please bring all of your medication bottles and inhalers (or a list of all 
your medications/inhalers) with you to every visit.



 Potential barriers to meeting all of your care plan goals will continue to be 
addressed on an ongoing basis.



documented as of this encounter



Results

Not on filedocumented in this encounter



Visit Diagnoses







 Diagnosis

 

 Diarrhea, unspecified type - Primary

 

 Nausea and vomiting, intractability of vomiting not specified, unspecified 
vomiting



 type

 

 Subcutaneous nodules







 Localized superficial swelling, mass, or lump



documented in this encounter



Insurance







 Payer  Benefit Plan /  Subscriber ID  Effective Dates  Phone  Address  Type



   Group          

 

 MEDICARE  MEDICARE PART A  xxxxxxxxxxx  2011-Present      Medicare



   & B          

 

 MEDICAID [facility]  xxxxxxxx  10/1/2017-Present      Medicaid [place]



   MEDICAID          









 Guarantor Name  Account Type  Relation to  Date of  Phone  Billing Address



     Patient  Birth    

 

 Leigha Foley  Personal/Family    1980  [phone]  [address]



         (Work)  [place], [place]



           [id number]



documented as of this encounter

## 2019-11-25 NOTE — XMS REPORT
Summary of Care

 Created on:2019



Patient:Leigha Foley

Sex:Female

:1980

External Reference #:[id number]





Demographics







 Address  [address] [place] [address]

 

 Mobile Phone  [phone]

 

 Email Address  [email or web address]

 

 Preferred Language  English

 

 Marital Status  Not  or 

 

 Caodaism Affiliation  Unknown

 

 Race  White

 

 Ethnic Group  Not  or 









Author







 Organization  [facility]

 

 Address  [address]



   [place] [address]









Support







 Name  Relationship  Address  Phone

 

 Simone Foley  Unavailable  Unavailable  [phone]

 

 Simone Foley  Unavailable  Unavailable  [phone]









Care Team Providers







 Name  Role  Phone

 

 Gabrielle Villanueva  Primary Care Provider  [phone]









Reason for Referral

MRI/CAT/PET Scan (Routine)





 Status  Reason  Specialty  Diagnoses / Procedures  Referred By Contact  
Referred To Contact

 

 Closed      Diagnoses



Subcutaneous nodules  [place], [place],  



       



Procedures



US SOFT TISSUE HEAD NECK ULTRASOUND  [facility]



  



          [place] [address]



  



         Phone: [phone]



  



         Fax: [phone]  









Reason for Visit







 Reason  Comments

 

 Fatigue  M5dayfb

 

 Lump  2 hard lumps one of them hurts, 3 "fluid pockets" like lumps all on the



   back on the back of the neck

 

 Hair Loss  not falling out at the roots.X2weeks

 

 Diarrhea  saw  wanted a stool sample , could not be resulted because 
sample



   was too solid

 

 Congestion  sinus pressure







Encounter Details







 Date  Type  Department  Care Team  Description

 

 2019  Office Visit  [facility]  [place], [place],  Malaise and fatigue 
(Primary Dx);



     Practice



  PA-C



  Diarrhea, unspecified type;



     [address]



  [address]



  Subcutaneous nodules;



     [place] [address]



  [place] [address]



  Hair loss



     [phone] (Fax)  







Allergies







 Active Allergy  Reactions  Severity  Noted Date  Comments

 

 Varenicline Tartrate  CNS Reaction    2013  luis

 

 Hydrocodone  GI Reaction    2013  Stomach upset

 

 Serotonin Reuptake Inhibitors  CNS Reaction    2013  Luis



documented as of this encounter (statuses as of 2019)



Medications







 Medication  Sig  Dispensed  Refills  Start Date  End Date  Status

 

 clonazePAM (KLONOPIN)  Take 1 mg by mouth    0      Active



 1 MG Oral Tab  AS NEEDED.          

 

 fluocinonide (LIDEX)  applly twice daily  120 g  5  2018    Active



 0.05 % Apply  sparingly          



 externally            



 CreamIndications:            



 Eczema, unspecified            



 type            

 

 naproxen sodium  Take 2 Tabs by  120 Tab  3  2018    Active



 (ANAPROX) 275 MG Oral  mouth TWO TIMES          



 TabIndications: Other  DAILY WITH MEALS.          



 chronic pain            

 

 Levonorgestrel  Insert IU with    0  10/07/2017  10/07/2023  Active



 (MIRENA, 52 MG,) 20  paracervical block          



 MCG/24HR Intrauterine            



 IUD            

 

 acetaminophen (APAP  Take 2 Tabs by  100 Tab  0  2019    Active



 EXTRA STRENGTH) 500  mouth EVERY SIX          



 MG Oral Tab  HOURS AS NEEDED          



   (abdominal pain).          



   MDD -  4          

 

 ibuprofen (MOTRIN)  Take 0.75 Tabs by  90 Tab  2  2019    Active



 800 MG Oral Tab  mouth EVERY EIGHT          



   HOURS AS NEEDED          



   (pain).          

 

 fluticasone (FLONASE)  Spray 2 Sprays in  1 Bottle  2  2019    Active



 50 MCG/ACT Nasal  nose DAILY.          



 Suspension            

 

 clonazePAM (KLONOPIN)  Take 0.5 mg by    0      Active



 0.5 MG Oral Tab  mouth AS NEEDED.          

 

 ondansetron (ZOFRAN  Take 1 Tab by  30 Tab  2  2019    Active



 ODT) 4 MG Oral TABLET  mouth EVERY SIX          



 DISPERSIBLE  HOURS AS NEEDED          



   (nausea).          

 

 OXYcodone-acetaminoph  Take 1 Tab by  100 Tab  0  2019    Active



 en (PERCOCET) 5-325  mouth EVERY SIX          



 MG Oral  HOURS AS NEEDED          



 TabIndications:  (pain). Max Daily          



 Abdominal pain,  Amount: 4 Tabs.          



 unspecified abdominal            



 location            









 Hospital, Clinic, or  Ordered Dose  Route  Frequency  Start Date  End Date  
Status



 Other Facility            



 Administered Medication            

 

 ibuprofen (MOTRIN)  600 mg  PO  TID WITH MEALS  2018    Active



 tablet 600 mg            



documented as of this encounter (statuses as of 2019)



Active Problems







 Problem  Noted Date

 

 Chronic pelvic pain in female  2019









 Overview: 







 Added automatically from request for surgery [id number]









 Eczema  2013

 

 Bipolar I disorder  2013









 Overview: 



Borderline personality



 disabled









 Sprain of neck  2013

 

 Shoulder injury  2013









 Overview: 







 Atrophy os the muscles on the right from a surgery of the neck









 Family history of brain aneurysm  2013









 Overview: 







 Mother and grandmother  - both at age 40s









 Family history of diabetes mellitus  2013

 

 Smoking  2013

 

 BMI 31.0-31.9,adult  2013









 Overview: 







 This patient's BMI has been calculated and is above average, and BMI 
management plan



 is completed.  General patient education discussion including:  obesity-related



 excess mortality



documented as of this encounter (statuses as of 2019)



Social History







 Tobacco Use  Types  Packs/Day  Years Used  Date

 

 Current Every Day Smoker  Cigarettes  1  18  









 Smokeless Tobacco: Never Used      









 Alcohol Use  Drinks/Week  oz/Week  Comments

 

 No  0 Standard drinks or equivalent  0.0  









 Sex Assigned at Birth  Date Recorded

 

 Not on file  









 Job Start Date  Occupation  Industry

 

 Not on file  Not on file  Not on file









 Travel History  Travel Start  Travel End









 No recent travel history available.



documented as of this encounter



Last Filed Vital Signs







 Vital Sign  Reading  Time Taken  Comments

 

 Blood Pressure  138/90  2019  8:52 AM EST  

 

 Pulse  96  2019  8:52 AM EST  

 

 Temperature  37.1 

  2019  8:52 AM EST  



   C (98.7 

    



   F)    

 

 Respiratory Rate  -  -  

 

 Oxygen Saturation  97%  2019  8:52 AM EST  

 

 Inhaled Oxygen Concentration  -  -  

 

 Weight  63.8 kg (140 lb 9.6 oz)  2019  8:52 AM EST  

 

 Height  157.5 cm (5' 2")  2019  8:52 AM EST  

 

 Body Mass Index  25.72  2019  8:52 AM EST  



documented in this encounter



Patient Instructions

Patient InstructionsDodgFlori bhatti PA-C - 2019  8:40 AM EST1. Ordered 
lab  -- will do now -- will call with results

2. Ordered stool cultures, patient will bring back to lab -- will call with 
results

3. Ordered US -- scheduled for later today, will call with results

Keep well hydrated, avoid dairy, bland dietElectronically signed by Flori Delong PA-C at 2019  9:29 AM EST

documented in this encounter



Progress Notes

Flori Delong PA-C - 2019  8:40 AM ESTFormatting of this note might be 
different from the original.

PATIENT:  Leigha Foley

MRN:  [id number]

:  1980

DATE OF SERVICE:  2019



REFERRING PRACTITIONER:  Self-Referred

PRIMARY CARE PROVIDER:  Gabrielle Villanueva



CHIEF COMPLAINT:

Chief Complaint

Patient presents with

 Fatigue

  Y2rgtrw

 Lump

  2 hard lumps one of them hurts, 3 "fluid pockets" like lumps all on the back 
on the back of the neck

 Hair Loss

  not falling out at the roots.X2weeks

 Diarrhea

  saw  wanted a stool sample , could not be resulted because sample 
was too solid



Subjective

HISTORY OF PRESENT ILLNESS:

Leigha Foley is a 39-y.o.  female who presents with multiple complaints

1. Fatigue x 4 weeks

Eating healthy diet

Water -- "not as much as I need to" -- 3 bottles

Coffee: 2 cups daily

2. 2 Tender lumps back of neck x 1 week

3. Hair loss x 2 weeks -- has been dying hair

4. Intermittent Non-bloody diarrhea x 2 weeks

Dr. Villanueva order stool culture but could not be cultured was too formed -- 
tested for C-Dif

Denies fever, chills, nausea, vomiting, chest pains, SOB





No past medical history on file.

Past Surgical History:

Procedure Laterality Date

 BIOPSY, LYMPH NODE, DEEP

  SECTION NEC

 x 1

 COLONOSCOPY N/A 2017

 Procedure: COLONOSCOPY;  Surgeon: Paulette Wei MD;  Location: [facility] OR

 EGD N/A 2017

 Procedure: ENDOSCOPY UPPER GI;  Surgeon: Paluette Wei MD;  Location: [facility] MA[facility] 
OR



No family history on file.

Current Outpatient Medications

Medication Sig

 acetaminophen (APAP EXTRA STRENGTH) 500 MG Oral Tab Take 2 Tabs by mouth 
EVERY SIX HOURS AS NEEDED (abdominal pain). MDD -  4

 clonazePAM (KLONOPIN) 0.5 MG Oral Tab Take 0.5 mg by mouth AS NEEDED.

 clonazePAM (KLONOPIN) 1 MG Oral Tab Take 1 mg by mouth AS NEEDED.

 fluocinonide (LIDEX) 0.05 % Apply externally Cream applly twice daily 
sparingly

 fluticasone (FLONASE) 50 MCG/ACT Nasal Suspension Spray 2 Sprays in nose 
DAILY.

 ibuprofen (MOTRIN) 800 MG Oral Tab Take 0.75 Tabs by mouth EVERY EIGHT 
HOURS AS NEEDED (pain).

 Levonorgestrel (MIRENA, 52 MG,) 20 MCG/24HR Intrauterine IUD Insert IU 
with paracervical block

 naproxen sodium (ANAPROX) 275 MG Oral Tab Take 2 Tabs by mouth TWO TIMES 
DAILY WITH MEALS.

 ondansetron (ZOFRAN ODT) 4 MG Oral TABLET DISPERSIBLE Take 1 Tab by 
mouth EVERY SIX HOURS AS NEEDED (nausea).

 OXYcodone-acetaminophen (PERCOCET) 5-325 MG Oral Tab Take 1 Tab by mouth 
EVERY SIX HOURS AS NEEDED (pain). Max Daily Amount: 4 Tabs.



Current Facility-Administered Medications

Medication

 ibuprofen (MOTRIN) tablet 600 mg



Allergies

Allergen Reactions

 Chantix [Varenicline Tartrate] CNS Reaction

  luis

 Hydrocodone GI Reaction

  Stomach upset

 Serotonin Reuptake Inhibitors CNS Reaction

  Luis





Social History



Socioeconomic History

 Marital status: 

  Spouse name: Not on file

 Number of children: Not on file

 Years of education: Not on file

 Highest education level: Not on file

Occupational History

 Not on file

Social Needs

 Financial resource strain: Not on file

 Food insecurity:

  Worry: Not on file

  Inability: Not on file

 Transportation needs:

  Medical: Not on file

  Non-medical: Not on file

Tobacco Use

 Smoking status: Current Every Day Smoker

  Packs/day: 1.00

  Years: 18.00

  Pack years: 18.00

  Types: Cigarettes

 Smokeless tobacco: Never Used

Substance and Sexual Activity

 Alcohol use: No

  Alcohol/week: 0.0 standard drinks

 Drug use: No

 Sexual activity: Not Currently

  Birth control/protection: Abstinence, I.U.D.

Lifestyle

 Physical activity:

  Days per week: Not on file

  Minutes per session: Not on file

 Stress: Not on file

Relationships

 Social connections:

  Talks on phone: Not on file

  Gets together: Not on file

  Attends Latter day service: Not on file

  Active member of club or organization: Not on file

  Attends meetings of clubs or organizations: Not on file

  Relationship status: Not on file

 Intimate partner violence:

  Fear of current or ex partner: Not on file

  Emotionally abused: Not on file

  Physically abused: Not on file

  Forced sexual activity: Not on file

Other Topics Concern

 Not on file

Social History Narrative

 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



REVIEW OF SYSTEMS:

Skin: 3 tender lumps back of neck

Eyes: negative visual blurring

Ears/Nose/Throat: negative rhinorrhea, sore throat, sinus pressure, post nasal 
drip

Respiratory: negative cough

Cardiovascular: negative chest pain

Gastrointestinal: negative abdominal pain, constipation, nausea or vomiting. 
Positive non-bloody diarrhea

Genitourinary: negative burning on urination, dysuria or vaginal discharge

Musculoskeletal: positive arthritis/joint pain

Neurologic: negative numbness or tingling of feet or hands

Psychiatric: positive Biploar

Hematologic/Lymphatic/Immunologic: negative allergies. Positive fatigue

Endocrine: negative diabetes or hot flashes/sweats



Objective

PHYSICAL EXAMINATION:

VITALS:  /90 (BP Location: Right arm, Patient Position: Sitting)  | Pulse 
96  | Temp 98.7 F (37.1 C) (Tympanic)  | Ht 5' 2" (1.575 m)  | Wt 140 
lb 9.6 oz (63.8 kg)  | SpO2 97%  | BMI 25.72 kg/m  Body mass index is 25.72 
kg/m.

General appearance - alert, mild distress, cooperative, oriented times 3

Skin - Skin color, texture, turgor normal. No rashes or lesions.

Head - Normocephalic. Posterior scalp, left side: 2 small subcutaneous nodules, 
right side 1 nodule,well defined edges, mobile, tender to palpation

Eyes - conjunctivae/corneas clear. PERRL, EOM's intact.

Oropharynx - Lips, mucosa, and tongue normal. Teeth and gums normal. Oropharynx
  normal.

Neck - Neck supple, FROM. No cervical or supraclavicular adenopathy.  Thyroid 
normal, no enlargement

Lungs -  Good diaphragmatic excursion. Lungs clear. Chest symmetrical. Normal 
breath sounds.

Heart -  RRR. No murmurs, clicks or gallops.  No peripheral edema.

ABDOMEN:  soft, non-tender. Bowel sounds normal. No masses, no organomegaly.



.



IMPRESSION:

  ICD-9-CM ICD-10-CM

1. Malaise and fatigue 780.79 R53.81 COMPREHENSIVE METABOLIC PANEL

  R53.83 CBC WITH DIFFERENTIAL

   THYROID STIMULATING HORMONE

   THYROID STIMULATING HORMONE

   CBC WITH DIFFERENTIAL

   COMPREHENSIVE METABOLIC PANEL

2. Diarrhea, unspecified type 787.91 R19.7 STOOL CULTURE

   C. DIFFICILE (STOOL)

   GIARDIA AND CRYPTOSPORIDIUM

3. Subcutaneous nodules 782.2 R22.9 US SOFT TISSUE HEAD NECK ULTRASOUND

4. Hair loss 704.00 L65.9





  Plan

PLAN:

1. Ordered lab  -- will do now -- will call with results

2. Ordered stool cultures, patient will bring back to lab -- will call with 
results

3. Ordered US -- scheduled for later today, will call with results

4. Ordered TSH

Keep well hydrated, avoid dairy, bland diet





Author:  Flori Delong PA-C 2019 08:44Electronically signed by Flori Delong PA-C at 2019  9:30 AM ESTdocumented in this encounter



Plan of Treatment







 Name  Type  Priority  Associated Diagnoses  Date/Time

 

 COMPREHENSIVE METABOLIC  Lab  Routine  Malaise and fatigue  2019  9:14 AM



 PANEL        EST

 

 CBC WITH DIFFERENTIAL  Lab  Routine  Malaise and fatigue  2019  9:14 AM



         EST

 

 THYROID STIMULATING  Lab  Routine  Malaise and fatigue  2019  9:14 AM



 HORMONE        EST

 

 US SOFT TISSUE HEAD NECK  Imaging  Routine  Subcutaneous nodules  2019  9
:21 AM



 ULTRASOUND        EST









 Name  Type  Priority  Associated Diagnoses  Order Schedule

 

 COMPREHENSIVE METABOLIC  Lab  Routine  Malaise and fatigue  Expected: 2019



 PANEL        (Approximate),



         Expires: 2020

 

 CBC WITH DIFFERENTIAL  Lab  Routine  Malaise and fatigue  Expected: 2019



         (Approximate),



         Expires: 2020

 

 THYROID STIMULATING  Lab  Routine  Malaise and fatigue  Expected: 2019



 HORMONE        (Approximate),



         Expires: 2020

 

 STOOL CULTURE  Lab  Routine  Diarrhea, unspecified  1 Occurrences



       type  starting 2019



         until 2020

 

 C. DIFFICILE (STOOL)  Lab  Routine  Diarrhea, unspecified  1 Occurrences



       type  starting 2019



         until 2020

 

 GIARDIA AND  Lab  Routine  Diarrhea, unspecified  1 Occurrences



 CRYPTOSPORIDIUM      type  starting 2019



         until 2020

 

 US SOFT TISSUE HEAD NECK  Imaging  Routine  Subcutaneous nodules  Expected: ,



 ULTRASOUND        Expires: 2020









 Health Maintenance  Due Date  Last Done  Comments

 

 MEDICARE ANNUAL WELLNESS VISIT  1980    

 

 PNEUMOCOCCAL 0-64 YRS (1 of 1  1986    



 - PPSV23)      

 

 INFLUENZA VACCINE (#1)  2019    

 

 DEPRESSION SCREENING  2020  

 

 PAP SMEAR  07/15/2022  07/15/2019,  



     2013  

 

 HPV IMMUNIZATION SERIES  Aged Out    No longer eligible based



       on patient's age to



       complete this topic

 

 MENINGOCOCCAL VACCINE IMM  Aged Out    No longer eligible based



       on patient's age to



       complete this topic



documented as of this encounter



Goals







 Goal  Patient Goal  Associated  Recent  Patient-Stated?  Author



   Type  Problems  Progress    

 

 Depression  Depression      No  Crepet,



 screen (PHQ-9)          MD Gabrielle



 total score < 5          









 Note: 



This is an individualized treatment (depression) goal for Leigha Foley:



 Displayed above is your goal for a depression screening (PHQ-9) score that 
would indicate good control of your depression.









 Lifestyle - Current Smoker  Lifestyle  Smoking    Gabrielle Dougherty MD









 Note: 



Smoking Cessation Plan



 



 Discussed smoking cessation with patient. Patient readiness to quit:yes



 



 Discussed smoking cessation plan according to AHRQ guidelines:counseled 
patient on the risks of tobacco use



 



 My Quit Plan:



 



 

 My quit date is set for asap



 



 

 Notify my friends, family, and co-workers about decision to quit. Will ask 
for their support and understanding



 



 

 Remove tobacco products from my environment. I will ask people not to smoke 
around me or in my home.



 



 

 I will anticipate challenges at the beginning and will try not to be 
discouraged. To remember the benefits of quitting such as improved health, 
feeling better about myself, saving money, etc.



 



 

 Reducing stressors and avoiding triggers are essential keys to my success



 



 

 Finding ways to distract myself when I have the urge to smoke such as taking 
a walk, reading, playing a board game, putting together a puzzle, etc.



 



 

 Taking medications as my healthcare provider has advised to help alleviate 
the urge to smoke. If I am unable to take the medication, I will discuss 
further with my healthcare provider.



 



 

 Recognize reasons for relapse in my past attempts. What did and did not work 
for me



 



 

 Consider connecting with group, individual, or telephone counseling



 



 .









 Keep a regular sleep schedule  Lifestyle      No  Gabrielle Villanueva MD









 Note: 



This is an individualized lifestyle goal for Leigha Foley:



 Please maintain a regular sleep schedule.  This may help with some symptoms of 
depression.









 Take all prescribed medications as directed  Self-management      Gabrielle Dougherty MD









 Note: 



This is an individualized self-management goal for Leigha Foley:



 Please take all prescribed medications as directed.



 1.  Do not skip doses.  If you cannot afford your medications, talk with your 
doctor.



 2.  Use a pill reminder system such as a pill box if needed.  Your pharmacist 
can help you with this.



 3.  Contact your Pharmacy 5 days before your medication runs out.  If you 
cannot take your medications for any reasons, talk with your doctor.



 4.  Please bring all of your medication bottles and inhalers (or a list of all 
your medications/inhalers) with you to every visit.



 Potential barriers to meeting all of your care plan goals will continue to be 
addressed on an ongoing basis.



documented as of this encounter



Results

Not on filedocumented in this encounter



Visit Diagnoses







 Diagnosis

 

 Malaise and fatigue - Primary







 Other malaise and fatigue

 

 Diarrhea, unspecified type

 

 Subcutaneous nodules







 Localized superficial swelling, mass, or lump

 

 Hair loss







 Alopecia, unspecified



documented in this encounter



Insurance







 Payer  Benefit Plan /  Subscriber ID  Effective Dates  Phone  Address  Type



   Group          

 

 MEDICARE  MEDICARE PART A  xxxxxxxxxxx  2011-Present      Medicare



   & B          

 

 MEDICAID [facility]  xxxxxxxx  10/1/2017-Present      Medicaid [place] MEDICAID          









 Guarantor Name  Account Type  Relation to  Date of  Phone  Billing Address



     Patient  Birth    

 

 Leigha Foley  Personal/Family    1980  [phone]  [address]



         (Work)  [place], [place]



           [id number]



documented as of this encounter

## 2019-11-25 NOTE — ED
Abdominal Pain/Female





- HPI Summary


HPI Summary: 


39-year-old  female with significant past medical history of bipolar 

disorder, ovarian cysts, diverticulosis presents to the emergency department 

today complaining of abdominal pain 3 weeks.  She states her abdominal pain is 

located in the left upper quadrant of her abdomen and she describes as a 

cramping feeling.  She states she is seen multiple doctors for the last 3 weeks 

and got no answers as to what caused her symptoms.  She states she has felt 

like she has had fevers, nausea, diarrhea, abdominal pain for 3 weeks.  She's 

taken Percocet prior to arrival for alleviation of her symptoms which she is 

given for her chronic pain from ovarian cysts.  She denies alcohol use but 

endorses occasional marijuana use.  She denies chest pain, shortness breath, 

blood per rectum, dark stools, light colored stools, vomiting, rash.








- History of Current Complaint


Chief Complaint: EDAbdPain


Stated Complaint: GENERAL


Time Seen by Provider: 11/25/19 09:40


Hx Obtained From: Patient


Hx Last Menstrual Period: 4/9/13


Onset/Duration: Gradual Onset, Lasting Weeks


Timing: Constant


Severity Initially: Moderate


Severity Currently: Moderate


Pain Intensity: 5


Pain Scale Used: 0-10 Numeric


Location: Discrete At: [facility]


Radiates: No


Aggravating Factor(s): Movement


Alleviating Factor(s): OTC Analgesics


Associated Signs and Symptoms: Positive: Diarrhea.  Negative: Diaphoresis, Fever

, Cough, Chest Pain, Dizzy, Constipation, Blood in Stool


Allergies/Adverse Reactions: 


 Allergies











Allergy/AdvReac Type Severity Reaction Status Date / Time


 


hydrocodone Allergy  Vomiting Verified 11/25/19 09:35


 


varenicline [From Chantix] Allergy  See Comment Verified 11/25/19 09:35


 


ssri Allergy  See Comment Uncoded 11/25/19 09:35














PMH/Surg Hx/FS Hx/Imm Hx


Endocrine/Hematology History: 


   Denies: Hx Anticoagulant Therapy, Hx Diabetes, Hx Systemic Lupus 

Erythematosus, Hx Thyroid Disease


Cardiovascular History: 


   Denies: Hx Hypertension


Respiratory History: 


   Denies: Hx Asthma, Hx Chronic Obstructive Pulmonary Disease (COPD)


GI History: Reports: Hx Cirrhosis - says may have non ETOH related?, Hx 

Diverticulosis - says may have?


   Denies: Hx Ulcer


 History: 


   Denies: Hx Renal Disease


Musculoskeletal History: 


   Denies: Hx Rheumatoid Arthritis


Sensory History: Reports: Hx Contacts or Glasses


   Denies: Hx Hearing Aid


Opthamlomology History: Reports: Hx Contacts or Glasses


Psychiatric History: Reports: Hx Anxiety, Hx Eating Disorder, Hx Depression, Hx 

Panic Disorder, Hx Inpatient Treatment, Hx Community Mental Health Tx, Hx 

Bipolar Disorder, Hx Substance Abuse, Other Psychiatric Issues/Disorders


   Denies: Hx Attention Deficit Hyperactivity Disorder, Hx Post Traumatic 

Stress Disorder, Hx Schizophrenia, Hx Suicide Attempt, Hx of Violent Episodes 

Against Others





- Cancer History


Cancer Type, Location and Year: patient gives pressured, bizarre account of 

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 

result of mets.


Hx Chemotherapy: No


Hx Radiation Therapy: No


Hx Palliative Cancer Treatment: No





- Surgical History


Surgery Procedure, Year, and Place: 2001 - RIGHT Lymph nodes removed to biopsy 

thought she had cancer - she didn't.  2011- C section


Infectious Disease History: No


Infectious Disease History: 


   Denies: Hx Clostridium Difficile, Hx Hepatitis, Hx Human Immunodeficiency 

Virus (HIV), Hx of Known/Suspected MRSA, Hx Shingles, Hx Tuberculosis, Hx Known/

Suspected VRE, Hx Known/Suspected VRSA, History Other Infectious Disease, 

Traveled Outside the [place] in Last 30 Days





- Family History


Known Family History: Positive: Diabetes - Father , Other - CVA mother





- Social History


Alcohol Use: Occasionally


Alcohol Amount: 1-2 drinks, 3-4 times per year


Hx Substance Use: Yes


Substance Use Type: Reports: Marijuana


Hx Tobacco Use: Yes


Smoking Status (MU): Heavy Every Day Tobacco Smoker


Type: Cigarettes


Have You Smoked in the Last Year: Yes





Review of Systems


Constitutional: Negative


Eyes: Negative


ENT: Negative


Cardiovascular: Negative


Respiratory: Negative


Positive: Abdominal Pain, Diarrhea, Nausea


Genitourinary: Negative


Musculoskeletal: Negative


Skin: Negative


Neurological: Negative


Psychological: Normal


All Other Systems Reviewed And Are Negative: Yes





Physical Exam


Triage Information Reviewed: Yes


Vital Signs On Initial Exam: 


 Initial Vitals











Temp Pulse Resp BP Pulse Ox


 


 98.8 F   93   16   134/104   98 


 


 11/25/19 09:32  11/25/19 09:32  11/25/19 09:32  11/25/19 09:32  11/25/19 09:32











Vital Signs Reviewed: Yes


Appearance: Positive: Well-Appearing, No Pain Distress, Well-Nourished


Skin: Positive: Warm, Skin Color Reflects Adequate Perfusion


Eyes: Positive: EOMI, DELL


ENT: Positive: Hearing grossly normal


Respiratory/Lung Sounds: Positive: Clear to Auscultation, Breath Sounds Present


Cardiovascular: Positive: RRR, S1, S2


Abdomen Description: Positive: Soft, Other: - Inspection reveals no ecchymosis 

or masses.  Auscultation reveals normoactive bowel sounds.  Percussion reveals 

normal tympany.  Palpation of the left upper quadrant reveals mild tenderness.  

Negative [place]'s, McBurney's, psoas, Rovsing, obturator sign..  Negative: CVA 

Tenderness (R), CVA Tenderness (L), Distended, Guarding, McBurney's Point 

Tenderness


Bowel Sounds: Positive: Present


Musculoskeletal: Positive: Normal


Neurological: Positive: Sensory/Motor Intact, Alert, Oriented to Person Place, 

Time, Facial Symmetry, Speech Normal


Psychiatric: Positive: Normal


AVPU Assessment: Alert





Procedures





- Sedation


Patient Received Moderate/Deep Sedation with Procedure: No





Diagnostics





- Vital Signs


 Vital Signs











  Temp Pulse Resp BP Pulse Ox


 


 11/25/19 09:32  98.8 F  93  16  134/104  98














- Laboratory


Result Diagrams: 


 11/25/19 10:07





 11/25/19 10:07


Lab Statement: Any lab studies that have been ordered have been reviewed, and 

results considered in the medical decision making process.





Abdominal Pain Fem Course/Dx





- Course


Course Of Treatment: Patient was evaluated in the emergency department today 

for abdominal pain.  The patient was seen and examined.  Patient's vital signs 

are stable and she was afebrile.  Laboratory studies as well as a CT with oral 

and IV contrast Abdomen and pelvis was obtained.  Laboratory studies revealed 

no evidence of leukocytosis or anemia, no electronic disturbances however she 

did have an elevated CRP at 48.08. No evidence of UTI. CT of the abdomen and 

pelvis revealed right basilar atelectasis, no definite evidence of 

diverticulitis.  No evidence of bowel obstruction.  The ovaries demonstrate 

normal-appearing follicles bilaterally.  The patient was told to follow-up with 

her GI doctor or primary care doctor for further evaluation and management of 

her symptoms.  She was told to return to the emergency department immediately 

if she developed any new or worsening symptoms.





- Diagnoses


Differential Diagnosis: Positive: Diverticulitis, Gall Bladder Disease, Ovarian 

Cyst


Provider Diagnoses: 


 Abdominal pain








Discharge ED





- Sign-Out/Discharge


Documenting (check all that apply): Patient Departure





- Discharge Plan


Condition: Stable


Disposition: HOME


Patient Education Materials:  Abdominal Pain (ED)


Referrals: 


Gabrielle Villanueva MD [Primary Care Provider] - 3 Days


Additional Instructions: 


You were seen in the emergency department today for abdominal pain.  I'm unsure 

why you are having abdominal pain however laboratory studies and a CT scan of 

the abdomen revealed no evidence of acute pathology including diverticulitis or 

ovarian cysts.  Please follow-up with your primary care for provider for 

further evaluation and management of your abdominal pain.  Please return to the 

emergency department immediately if you develop any new or worsening symptoms.





- Billing Disposition and Condition


Condition: STABLE


Disposition: Home